# Patient Record
Sex: FEMALE | Race: ASIAN | NOT HISPANIC OR LATINO | Employment: UNEMPLOYED | URBAN - METROPOLITAN AREA
[De-identification: names, ages, dates, MRNs, and addresses within clinical notes are randomized per-mention and may not be internally consistent; named-entity substitution may affect disease eponyms.]

---

## 2017-12-27 ENCOUNTER — HOSPITAL ENCOUNTER (EMERGENCY)
Facility: HOSPITAL | Age: 72
Discharge: HOME | End: 2017-12-27
Payer: MEDICARE

## 2017-12-27 VITALS
SYSTOLIC BLOOD PRESSURE: 142 MMHG | BODY MASS INDEX: 19.83 KG/M2 | WEIGHT: 101 LBS | DIASTOLIC BLOOD PRESSURE: 77 MMHG | OXYGEN SATURATION: 99 % | TEMPERATURE: 97 F | RESPIRATION RATE: 20 BRPM | HEART RATE: 88 BPM | HEIGHT: 60 IN

## 2017-12-27 DIAGNOSIS — E86.0 DEHYDRATION: ICD-10-CM

## 2017-12-27 DIAGNOSIS — R91.1 PULMONARY NODULE: ICD-10-CM

## 2017-12-27 DIAGNOSIS — H60.91 OTITIS EXTERNA OF RIGHT EAR, UNSPECIFIED CHRONICITY, UNSPECIFIED TYPE: ICD-10-CM

## 2017-12-27 DIAGNOSIS — R53.83 FATIGUE: ICD-10-CM

## 2017-12-27 DIAGNOSIS — E87.1 HYPONATREMIA: Primary | ICD-10-CM

## 2017-12-27 DIAGNOSIS — I10 ORTHOSTATIC HYPERTENSION: ICD-10-CM

## 2017-12-27 LAB
ANION GAP SERPL CALC-SCNC: 11 MMOL/L
BASOPHILS # BLD AUTO: 0.03 K/UL
BASOPHILS NFR BLD: 0.3 %
BNP SERPL-MCNC: <10 PG/ML
BUN SERPL-MCNC: 18 MG/DL
CALCIUM SERPL-MCNC: 10.4 MG/DL
CHLORIDE SERPL-SCNC: 96 MMOL/L
CO2 SERPL-SCNC: 23 MMOL/L
CREAT SERPL-MCNC: 1.1 MG/DL
DIFFERENTIAL METHOD: ABNORMAL
EOSINOPHIL # BLD AUTO: 0.1 K/UL
EOSINOPHIL NFR BLD: 1.3 %
ERYTHROCYTE [DISTWIDTH] IN BLOOD BY AUTOMATED COUNT: 12.3 %
EST. GFR  (AFRICAN AMERICAN): 58 ML/MIN/1.73 M^2
EST. GFR  (NON AFRICAN AMERICAN): 51 ML/MIN/1.73 M^2
GLUCOSE SERPL-MCNC: 148 MG/DL
HCT VFR BLD AUTO: 35.3 %
HGB BLD-MCNC: 12.2 G/DL
LYMPHOCYTES # BLD AUTO: 2.7 K/UL
LYMPHOCYTES NFR BLD: 30 %
MCH RBC QN AUTO: 30.2 PG
MCHC RBC AUTO-ENTMCNC: 34.6 G/DL
MCV RBC AUTO: 87 FL
MONOCYTES # BLD AUTO: 0.5 K/UL
MONOCYTES NFR BLD: 5.6 %
NEUTROPHILS # BLD AUTO: 5.7 K/UL
NEUTROPHILS NFR BLD: 62.8 %
PLATELET # BLD AUTO: 228 K/UL
PMV BLD AUTO: 9.5 FL
POTASSIUM SERPL-SCNC: 4.8 MMOL/L
RBC # BLD AUTO: 4.04 M/UL
SODIUM SERPL-SCNC: 130 MMOL/L
TROPONIN I SERPL DL<=0.01 NG/ML-MCNC: 0.01 NG/ML
WBC # BLD AUTO: 9.03 K/UL

## 2017-12-27 PROCEDURE — 25000003 PHARM REV CODE 250: Performed by: PHYSICIAN ASSISTANT

## 2017-12-27 PROCEDURE — 93010 ELECTROCARDIOGRAM REPORT: CPT | Mod: ,,, | Performed by: INTERNAL MEDICINE

## 2017-12-27 PROCEDURE — 96360 HYDRATION IV INFUSION INIT: CPT

## 2017-12-27 PROCEDURE — 84484 ASSAY OF TROPONIN QUANT: CPT

## 2017-12-27 PROCEDURE — 99284 EMERGENCY DEPT VISIT MOD MDM: CPT | Mod: 25

## 2017-12-27 PROCEDURE — 85025 COMPLETE CBC W/AUTO DIFF WBC: CPT

## 2017-12-27 PROCEDURE — 83880 ASSAY OF NATRIURETIC PEPTIDE: CPT

## 2017-12-27 PROCEDURE — 93005 ELECTROCARDIOGRAM TRACING: CPT

## 2017-12-27 PROCEDURE — 80048 BASIC METABOLIC PNL TOTAL CA: CPT

## 2017-12-27 RX ORDER — NEOMYCIN SULFATE, POLYMYXIN B SULFATE, HYDROCORTISONE 3.5; 10000; 1 MG/ML; [USP'U]/ML; MG/ML
4 SOLUTION/ DROPS AURICULAR (OTIC)
Status: COMPLETED | OUTPATIENT
Start: 2017-12-27 | End: 2017-12-27

## 2017-12-27 RX ORDER — NEOMYCIN SULFATE, POLYMYXIN B SULFATE AND HYDROCORTISONE 10; 3.5; 1 MG/ML; MG/ML; [USP'U]/ML
4 SUSPENSION/ DROPS AURICULAR (OTIC) 3 TIMES DAILY
Qty: 10 ML | Refills: 0 | Status: SHIPPED | OUTPATIENT
Start: 2017-12-27 | End: 2023-09-06

## 2017-12-27 RX ADMIN — NEOMYCIN SULFATE, POLYMYXIN B SULFATE, HYDROCORTISONE 4 DROP: 3.5; 10000; 1 SOLUTION/ DROPS AURICULAR (OTIC) at 10:12

## 2017-12-27 RX ADMIN — SODIUM CHLORIDE 1000 ML: 0.9 INJECTION, SOLUTION INTRAVENOUS at 08:12

## 2017-12-28 NOTE — ED PROVIDER NOTES
"SCRIBE #1 NOTE: I, Chandra Maher, am scribing for, and in the presence of, XENA Alexandra. I have scribed the entire note.      History      Chief Complaint   Patient presents with    Hypertension       Review of patient's allergies indicates:  No Known Allergies     HPI   HPI    12/27/2017, 7:29 PM   History obtained from the son and patient      History of Present Illness: Shannan Shin is a 72 y.o. female patient with a hx of HTN and DM presents to the Emergency Department for an emergent evaluation due to fatigue, decreased appetite, right ear pain, and uncontrolled blood pressure. She lives in a private residence with her daughter and son in law. He is the main historian and is able to translate for her. He states that he first noticed her symptoms about 1 week ago. Pt was seen by her PCP for this week. She was treated with ear wax removal drops and instructed to take her Amlodipine in the evening instead of in the morning. Despite following the PCP's instructions, her systolic BP was in the 180s this evening, which prompted her son in law to bring her to the ER. He states that she is still complaining of right ear pain, even after using the ear wax removal drops. He states that she "seems tired" and is "only eating small portions".  in for an evaluation. Pt and family deny any fever, chills, SOB, dysuria, HA, CP, and all other sx at this time.     Arrival mode: Personal vehicle    PCP: Danny Jackson MD       Past Medical History:  Past Medical History:   Diagnosis Date    Diabetes mellitus     HTN (hypertension)     Hyperlipidemia        Past Surgical History:  Past Surgical History:   Procedure Laterality Date    COLONOSCOPY N/A 2/2/2016    Procedure: COLONOSCOPY;  Surgeon: Issac Gracia MD;  Location: [facility];  Service: Endoscopy;  Laterality: N/A;        Family History:  Family history reviewed not relevant      Social History:  Social History     Social History Main Topics    Smoking " status: Former Smoker    Smokeless tobacco: Current User      Comment: quit 30-40  yrs ago    Alcohol use No    Drug use: No    Sexual activity: Unknown       ROS   Review of Systems   Constitutional: Positive for appetite change (Decreased) and fatigue. Negative for diaphoresis and fever.   HENT: Positive for ear pain (Right ear). Negative for congestion, ear discharge, facial swelling, rhinorrhea, sore throat, trouble swallowing and voice change.    Eyes: Negative for discharge and redness.   Respiratory: Negative for cough, shortness of breath and wheezing.    Cardiovascular: Negative for chest pain and leg swelling.   Gastrointestinal: Negative for abdominal distention, abdominal pain, blood in stool, constipation, diarrhea, nausea and vomiting.   Genitourinary: Negative for difficulty urinating, dysuria, flank pain, frequency and hematuria.   Musculoskeletal: Negative for back pain, gait problem, myalgias, neck pain and neck stiffness.   Skin: Negative for color change and rash.   Neurological: Negative for dizziness, seizures, syncope, facial asymmetry, speech difficulty, weakness, light-headedness, numbness and headaches.   Psychiatric/Behavioral: Negative for confusion.     Physical Exam      Initial Vitals [12/27/17 1805]   BP Pulse Resp Temp SpO2   (!) 148/74 107 20 98.7 °F (37.1 °C) 98 %      MAP       98.67          Physical Exam  Nursing Notes and Vital Signs Reviewed.  Constitutional: Patient does not appear to be in any distress. Well-developed and well-nourished. Ambulatory. Alert and interactive.   Head: Normocephalic.  Eyes: PERRL. EOM intact. Conjunctivae are not pale. No scleral icterus.  ENT: Mucous membranes are moist. Clear throat. No sinus congestion. Right external auditory canal is macerated, erythematous and swollen. No otorrhea. No mastoid tenderness. No adenopathy.   Neck: Supple. Full ROM. No lymphadenopathy.  Cardiovascular: Regular rate. Regular rhythm.  Pulmonary/Chest: No  respiratory distress. Clear to auscultation bilaterally. No cough. No tachypnea. No wheezing or rales.  Abdominal: Soft and non-distended. Non-tender.   Musculoskeletal: Moves all extremities. No obvious deformities. No lower leg edema. No CVA tenderness.   Skin: Warm and dry. No rash.   Neurological:  Alert, awake, and appropriate. Baseline cognitive state per family. Normal gait. Equal . Follows commands appropriately. No facial droop. No acute focal neurological deficits are appreciated.  Psychiatric: Normal affect. Good eye contact. Appropriate in content.    ED Course    Procedures  ED Vital Signs:  Vitals:    12/27/17 1805 12/27/17 2046 12/27/17 2047 12/27/17 2049   BP: (!) 148/74 (!) 149/69 (!) 160/91 (!) 162/76   Pulse: 107 93 98 108   Resp: 20   18   Temp: 98.7 °F (37.1 °C)      TempSrc: Oral      SpO2: 98% 97%  99%   Weight: 45.8 kg (100 lb 15.5 oz)      Height: 5' (1.524 m)          Abnormal Lab Results:  Labs Reviewed   CBC W/ AUTO DIFFERENTIAL - Abnormal; Notable for the following:        Result Value    Hematocrit 35.3 (*)     All other components within normal limits   BASIC METABOLIC PANEL - Abnormal; Notable for the following:     Sodium 130 (*)     Glucose 148 (*)     eGFR if  58 (*)     eGFR if non  51 (*)     All other components within normal limits   TROPONIN I   B-TYPE NATRIURETIC PEPTIDE        All Lab Results:  Results for orders placed or performed during the hospital encounter of 12/27/17   CBC auto differential   Result Value Ref Range    WBC 9.03 3.90 - 12.70 K/uL    RBC 4.04 4.00 - 5.40 M/uL    Hemoglobin 12.2 12.0 - 16.0 g/dL    Hematocrit 35.3 (L) 37.0 - 48.5 %    MCV 87 82 - 98 fL    MCH 30.2 27.0 - 31.0 pg    MCHC 34.6 32.0 - 36.0 g/dL    RDW 12.3 11.5 - 14.5 %    Platelets 228 150 - 350 K/uL    MPV 9.5 9.2 - 12.9 fL    Gran # 5.7 1.8 - 7.7 K/uL    Lymph # 2.7 1.0 - 4.8 K/uL    Mono # 0.5 0.3 - 1.0 K/uL    Eos # 0.1 0.0 - 0.5 K/uL    Baso # 0.03  0.00 - 0.20 K/uL    Gran% 62.8 38.0 - 73.0 %    Lymph% 30.0 18.0 - 48.0 %    Mono% 5.6 4.0 - 15.0 %    Eosinophil% 1.3 0.0 - 8.0 %    Basophil% 0.3 0.0 - 1.9 %    Differential Method Automated    Basic metabolic panel   Result Value Ref Range    Sodium 130 (L) 136 - 145 mmol/L    Potassium 4.8 3.5 - 5.1 mmol/L    Chloride 96 95 - 110 mmol/L    CO2 23 23 - 29 mmol/L    Glucose 148 (H) 70 - 110 mg/dL    BUN, Bld 18 8 - 23 mg/dL    Creatinine 1.1 0.5 - 1.4 mg/dL    Calcium 10.4 8.7 - 10.5 mg/dL    Anion Gap 11 8 - 16 mmol/L    eGFR if African American 58 (A) >60 mL/min/1.73 m^2    eGFR if non African American 51 (A) >60 mL/min/1.73 m^2   Troponin I   Result Value Ref Range    Troponin I 0.009 0.000 - 0.026 ng/mL   Brain natriuretic peptide   Result Value Ref Range    BNP <10 0 - 99 pg/mL         Imaging Results:  Imaging Results          X-Ray Chest PA And Lateral (Final result)  Result time 12/27/17 19:57:31    Final result by Diego Hu MD (12/27/17 19:57:31)                 Impression:     7 mm ill-defined opacity peripheral right upper lobe, scarring versus nodule.  No old study.      Electronically signed by: DIEGO HU  Date:     12/27/17  Time:    19:57              Narrative:    Chest x-ray 2 views    Indication:R53.83 Other fatigue;    Findings: There is no prior study.  Normal size heart.  Aortic atherosclerosis.  No congestion.  There is a 7 mm ill-defined opacity peripheral right upper lobe just between the anterior 2nd and 3rd ribs.  Scarring?  Nodule?  Otherwise, lungs are clear.                             Vitals:    12/27/17 1805 12/27/17 2046 12/27/17 2047 12/27/17 2049   BP: (!) 148/74 (!) 149/69 (!) 160/91 (!) 162/76   BP Location: Right arm      Patient Position: Sitting Lying Sitting Standing   Pulse: 107 93 98 108   Resp: 20   18   Temp: 98.7 °F (37.1 °C)      TempSrc: Oral      SpO2: 98% 97%  99%   Weight: 45.8 kg (100 lb 15.5 oz)      Height: 5' (1.524 m)             The EKG was ordered,  reviewed, and independently interpreted by the ED provider.  Interpretation time: 19:58  Rate: 98 BPM  Rhythm: normal sinus rhythm  Interpretation: No STEMI.      The Emergency Provider reviewed the vital signs and test results, which are outlined above.    ED Discussion     9:47 PM: Reassessed pt at this time. Pt is awake, alert, and in NAD. Discussed with pt all pertinent ED information. Discussed pt dx of hyponatremia and plan of tx. Gave pt all f/u and return to the ED instructions. All questions and concerns were addressed at this time. Pt expresses understanding of information and instructions, and is comfortable with plan to discharge. Pt is stable for discharge.    I discussed with patient and/or family/caretaker that evaluation in the ED does not suggest any emergent or life threatening medical conditions requiring immediate intervention beyond what was provided in the ED, and I believe patient is safe for discharge.  Regardless, an unremarkable evaluation in the ED does not preclude the development or presence of a serious of life threatening condition. As such, patient was instructed to return immediately for any worsening or change in current symptoms.      ED Medication(s):  Medications   neomycin-polymyxin-hydrocortisone otic solution 4 drop (not administered)   sodium chloride 0.9% bolus 1,000 mL (0 mLs Intravenous Stopped 12/27/17 2137)       New Prescriptions    NEOMYCIN-POLYMYXIN-HYDROCORTISONE (CORTISPORIN) 3.5-10,000-1 MG/ML-UNIT/ML-% OTIC SUSPENSION    Place 4 drops into the right ear 3 (three) times daily.       Follow-up Information     Danny Jackson MD. Schedule an appointment as soon as possible for a visit in 2 days.    Specialty:  Cardiology  Why:  Follow up with your primary care physician in the next 1-2 days for re-evaluation and further management.  Contact information:  [address] [place] [address] [phone]             [facility] - .    Specialty:   Emergency Medicine  Why:  If symptoms worsen in any way.  Contact information:  [address]  [facility] [address] [phone]                   Medical Decision Making    Medical Decision Making:   History:   I obtained history from: someone other than patient.  Old Medical Records: I decided to obtain old medical records.  Initial Assessment:   Here due to fatigue, decreased appetite, right otalgia, and elevated blood pressure x 1 week per family.   Clinical Tests:   Lab Tests: Ordered and Reviewed  Radiological Study: Ordered and Reviewed  Medical Tests: Ordered and Reviewed  ED Management:  Labs reviewed - low Na - IV fluids administered in the ER   Orthostatic Vital signs reviewed - Increased HR and BP with position changes - IV fluid bolus administered in the ER.   Right otalgia - appears that they have been overusing the Debrox drops; the canal is wax free, but macerated, erythematous, and swollen, tender to exam. Advised to DC Debrox - Cortisporin otic drops provided  Discussed pulmonary nodule and need for follow up with repeat chest x ray in 3 months.         Additional MDM:   EKG: I have independently interpreted EKG(s) - see notes.   X-Rays: I have independently interpreted X-Ray(s) - see notes.        Scribe Attestation:   Scribe #1: I performed the above scribed service and the documentation accurately describes the services I performed. I attest to the accuracy of the note.    Attending:   Physician Attestation Statement for Scribe #1: I, XENA Alexandra, personally performed the services described in this documentation, as scribed by Chandra Maher, in my presence, and it is both accurate and complete.          Clinical Impression       ICD-10-CM ICD-9-CM   1. Hyponatremia E87.1 276.1   2. Fatigue R53.83 780.79   3. Otitis externa of right ear, unspecified chronicity, unspecified type H60.91 380.10   4. Orthostatic hypertension I10 401.9   5. Dehydration E86.0 276.51    6. Pulmonary nodule R91.1 793.11       Disposition:   Disposition: Discharged  Condition: Stable         Kevin Morales PA-C  12/27/17 2059

## 2023-09-05 LAB
INFLUENZA A, MOLECULAR: NEGATIVE
INFLUENZA B, MOLECULAR: NEGATIVE
SARS-COV-2 RDRP RESP QL NAA+PROBE: NEGATIVE
SPECIMEN SOURCE: NORMAL

## 2023-09-05 PROCEDURE — 87502 INFLUENZA DNA AMP PROBE: CPT | Performed by: EMERGENCY MEDICINE

## 2023-09-05 PROCEDURE — U0002 COVID-19 LAB TEST NON-CDC: HCPCS | Performed by: EMERGENCY MEDICINE

## 2023-09-05 PROCEDURE — 87502 INFLUENZA DNA AMP PROBE: CPT

## 2023-09-06 ENCOUNTER — HOSPITAL ENCOUNTER (INPATIENT)
Facility: HOSPITAL | Age: 78
LOS: 3 days | Discharge: HOME | DRG: 872 | End: 2023-09-09
Attending: EMERGENCY MEDICINE | Admitting: INTERNAL MEDICINE
Payer: MEDICARE

## 2023-09-06 DIAGNOSIS — R07.9 CHEST PAIN: ICD-10-CM

## 2023-09-06 DIAGNOSIS — A41.9 SEPSIS DUE TO URINARY TRACT INFECTION: ICD-10-CM

## 2023-09-06 DIAGNOSIS — N39.0 SEPSIS DUE TO URINARY TRACT INFECTION: ICD-10-CM

## 2023-09-06 DIAGNOSIS — R53.1 WEAKNESS: ICD-10-CM

## 2023-09-06 PROBLEM — E11.9 DIABETES: Status: ACTIVE | Noted: 2023-09-06

## 2023-09-06 PROBLEM — N17.9 ARF (ACUTE RENAL FAILURE): Status: ACTIVE | Noted: 2023-09-06

## 2023-09-06 PROBLEM — E87.1 HYPONATREMIA: Status: ACTIVE | Noted: 2023-09-06

## 2023-09-06 PROBLEM — N30.00 ACUTE CYSTITIS WITHOUT HEMATURIA: Status: ACTIVE | Noted: 2023-09-06

## 2023-09-06 PROBLEM — I10 HYPERTENSION: Status: ACTIVE | Noted: 2023-09-06

## 2023-09-06 PROBLEM — E87.20 METABOLIC ACIDOSIS: Status: ACTIVE | Noted: 2023-09-06

## 2023-09-06 LAB
ABO + RH BLD: NORMAL
ACANTHOCYTES BLD QL SMEAR: ABNORMAL
ACINETOBACTER CALCOACETICUS/BAUMANNII COMPLEX: NOT DETECTED
ALBUMIN SERPL BCP-MCNC: 2.7 G/DL (ref 3.5–5.2)
ALBUMIN SERPL BCP-MCNC: 3.3 G/DL (ref 3.5–5.2)
ALP SERPL-CCNC: 51 U/L (ref 55–135)
ALP SERPL-CCNC: 60 U/L (ref 55–135)
ALT SERPL W/O P-5'-P-CCNC: 12 U/L (ref 10–44)
ALT SERPL W/O P-5'-P-CCNC: 13 U/L (ref 10–44)
ANION GAP SERPL CALC-SCNC: 12 MMOL/L (ref 8–16)
ANION GAP SERPL CALC-SCNC: 14 MMOL/L (ref 8–16)
ANISOCYTOSIS BLD QL SMEAR: SLIGHT
AST SERPL-CCNC: 23 U/L (ref 10–40)
AST SERPL-CCNC: 24 U/L (ref 10–40)
BACTERIA #/AREA URNS HPF: ABNORMAL /HPF
BACTEROIDES FRAGILIS: NOT DETECTED
BASOPHILS NFR BLD: 0 % (ref 0–1.9)
BASOPHILS NFR BLD: 0 % (ref 0–1.9)
BILIRUB SERPL-MCNC: 0.4 MG/DL (ref 0.1–1)
BILIRUB SERPL-MCNC: 0.5 MG/DL (ref 0.1–1)
BILIRUB UR QL STRIP: NEGATIVE
BLD GP AB SCN CELLS X3 SERPL QL: NORMAL
BLD PROD TYP BPU: NORMAL
BLOOD UNIT EXPIRATION DATE: NORMAL
BLOOD UNIT TYPE CODE: 7300
BLOOD UNIT TYPE: NORMAL
BNP SERPL-MCNC: 97 PG/ML (ref 0–99)
BUN SERPL-MCNC: 25 MG/DL (ref 8–23)
BUN SERPL-MCNC: 26 MG/DL (ref 8–23)
BURR CELLS BLD QL SMEAR: ABNORMAL
CALCIUM SERPL-MCNC: 8 MG/DL (ref 8.7–10.5)
CALCIUM SERPL-MCNC: 9.2 MG/DL (ref 8.7–10.5)
CANDIDA ALBICANS: NOT DETECTED
CANDIDA AURIS: NOT DETECTED
CANDIDA GLABRATA: NOT DETECTED
CANDIDA KRUSEI: NOT DETECTED
CANDIDA PARAPSILOSIS: NOT DETECTED
CANDIDA TROPICALIS: NOT DETECTED
CHLORIDE SERPL-SCNC: 101 MMOL/L (ref 95–110)
CHLORIDE SERPL-SCNC: 107 MMOL/L (ref 95–110)
CLARITY UR: ABNORMAL
CO2 SERPL-SCNC: 13 MMOL/L (ref 23–29)
CO2 SERPL-SCNC: 16 MMOL/L (ref 23–29)
CODING SYSTEM: NORMAL
COLOR UR: YELLOW
CREAT SERPL-MCNC: 1.9 MG/DL (ref 0.5–1.4)
CREAT SERPL-MCNC: 2.2 MG/DL (ref 0.5–1.4)
CROSSMATCH INTERPRETATION: NORMAL
CRYPTOCOCCUS NEOFORMANS/GATTII: NOT DETECTED
CTX-M GENE: NOT DETECTED
DIFFERENTIAL METHOD: ABNORMAL
DIFFERENTIAL METHOD: ABNORMAL
DISPENSE STATUS: NORMAL
DOHLE BOD BLD QL SMEAR: PRESENT
ENTEROBACTER CLOACAE COMPLEX: NOT DETECTED
ENTEROBACTERALES: ABNORMAL
ENTEROCOCCUS FAECALIS: NOT DETECTED
ENTEROCOCCUS FAECIUM: NOT DETECTED
EOSINOPHIL NFR BLD: 0 % (ref 0–8)
EOSINOPHIL NFR BLD: 0 % (ref 0–8)
ERYTHROCYTE [DISTWIDTH] IN BLOOD BY AUTOMATED COUNT: 11.4 % (ref 11.5–14.5)
ERYTHROCYTE [DISTWIDTH] IN BLOOD BY AUTOMATED COUNT: 11.4 % (ref 11.5–14.5)
ESCHERICHIA COLI: DETECTED
EST. GFR  (NO RACE VARIABLE): 23 ML/MIN/1.73 M^2
EST. GFR  (NO RACE VARIABLE): 27 ML/MIN/1.73 M^2
ESTIMATED AVG GLUCOSE: 117 MG/DL (ref 68–131)
FERRITIN SERPL-MCNC: 307 NG/ML (ref 20–300)
FOLATE SERPL-MCNC: 11.8 NG/ML (ref 4–24)
GLUCOSE SERPL-MCNC: 111 MG/DL (ref 70–110)
GLUCOSE SERPL-MCNC: 113 MG/DL (ref 70–110)
GLUCOSE UR QL STRIP: NEGATIVE
HAEMOPHILUS INFLUENZAE: NOT DETECTED
HBA1C MFR BLD: 5.7 % (ref 4–5.6)
HCT VFR BLD AUTO: 24.3 % (ref 37–48.5)
HCT VFR BLD AUTO: 27.4 % (ref 37–48.5)
HCV AB SERPL QL IA: NEGATIVE
HEP C VIRUS HOLD SPECIMEN: NORMAL
HGB BLD-MCNC: 7.7 G/DL (ref 12–16)
HGB BLD-MCNC: 9.1 G/DL (ref 12–16)
HGB UR QL STRIP: ABNORMAL
HIV 1+2 AB+HIV1 P24 AG SERPL QL IA: NEGATIVE
HYALINE CASTS #/AREA URNS LPF: 2 /LPF
IMM GRANULOCYTES # BLD AUTO: ABNORMAL K/UL (ref 0–0.04)
IMM GRANULOCYTES # BLD AUTO: ABNORMAL K/UL (ref 0–0.04)
IMM GRANULOCYTES NFR BLD AUTO: ABNORMAL % (ref 0–0.5)
IMM GRANULOCYTES NFR BLD AUTO: ABNORMAL % (ref 0–0.5)
IMP GENE: NOT DETECTED
INFLUENZA A, MOLECULAR: NEGATIVE
INFLUENZA B, MOLECULAR: NEGATIVE
IRON SERPL-MCNC: 12 UG/DL (ref 30–160)
KETONES UR QL STRIP: ABNORMAL
KLEBSIELLA AEROGENES: NOT DETECTED
KLEBSIELLA OXYTOCA: NOT DETECTED
KLEBSIELLA PNEUMONIAE GROUP: NOT DETECTED
KPC: NOT DETECTED
LACTATE SERPL-SCNC: 0.7 MMOL/L (ref 0.5–2.2)
LACTATE SERPL-SCNC: 1.4 MMOL/L (ref 0.5–2.2)
LEUKOCYTE ESTERASE UR QL STRIP: ABNORMAL
LISTERIA MONOCYTOGENES: NOT DETECTED
LYMPHOCYTES NFR BLD: 8 % (ref 18–48)
LYMPHOCYTES NFR BLD: 9 % (ref 18–48)
MAGNESIUM SERPL-MCNC: 1.3 MG/DL (ref 1.6–2.6)
MCH RBC QN AUTO: 30.4 PG (ref 27–31)
MCH RBC QN AUTO: 31 PG (ref 27–31)
MCHC RBC AUTO-ENTMCNC: 31.7 G/DL (ref 32–36)
MCHC RBC AUTO-ENTMCNC: 33.2 G/DL (ref 32–36)
MCR-1: NOT DETECTED
MCV RBC AUTO: 93 FL (ref 82–98)
MCV RBC AUTO: 96 FL (ref 82–98)
MEC A/C AND MREJ (MRSA): NOT DETECTED
MEC A/C: NOT DETECTED
MICROSCOPIC COMMENT: ABNORMAL
MONOCYTES NFR BLD: 1 % (ref 4–15)
MONOCYTES NFR BLD: 4 % (ref 4–15)
NDM GENE: NOT DETECTED
NEISSERIA MENINGITIDIS: NOT DETECTED
NEUTROPHILS NFR BLD: 74 % (ref 38–73)
NEUTROPHILS NFR BLD: 79 % (ref 38–73)
NEUTS BAND NFR BLD MANUAL: 16 %
NEUTS BAND NFR BLD MANUAL: 9 %
NITRITE UR QL STRIP: NEGATIVE
NRBC BLD-RTO: 0 /100 WBC
NRBC BLD-RTO: 0 /100 WBC
NUM UNITS TRANS PACKED RBC: NORMAL
OVALOCYTES BLD QL SMEAR: ABNORMAL
OXA-48-LIKE: NOT DETECTED
PH UR STRIP: 6 [PH] (ref 5–8)
PHOSPHATE SERPL-MCNC: 3.8 MG/DL (ref 2.7–4.5)
PLATELET # BLD AUTO: 160 K/UL (ref 150–450)
PLATELET # BLD AUTO: 195 K/UL (ref 150–450)
PLATELET BLD QL SMEAR: ABNORMAL
PMV BLD AUTO: 10.1 FL (ref 9.2–12.9)
PMV BLD AUTO: 9.6 FL (ref 9.2–12.9)
POCT GLUCOSE: 110 MG/DL (ref 70–110)
POCT GLUCOSE: 150 MG/DL (ref 70–110)
POIKILOCYTOSIS BLD QL SMEAR: SLIGHT
POLYCHROMASIA BLD QL SMEAR: ABNORMAL
POLYCHROMASIA BLD QL SMEAR: ABNORMAL
POTASSIUM SERPL-SCNC: 4 MMOL/L (ref 3.5–5.1)
POTASSIUM SERPL-SCNC: 4.7 MMOL/L (ref 3.5–5.1)
PROCALCITONIN SERPL IA-MCNC: 27.92 NG/ML
PROT SERPL-MCNC: 5.9 G/DL (ref 6–8.4)
PROT SERPL-MCNC: 7.2 G/DL (ref 6–8.4)
PROT UR QL STRIP: ABNORMAL
PROTEUS SPECIES: NOT DETECTED
PSEUDOMONAS AERUGINOSA: NOT DETECTED
RBC # BLD AUTO: 2.53 M/UL (ref 4–5.4)
RBC # BLD AUTO: 2.94 M/UL (ref 4–5.4)
RBC #/AREA URNS HPF: 9 /HPF (ref 0–4)
SALMONELLA SP: NOT DETECTED
SARS-COV-2 RDRP RESP QL NAA+PROBE: NEGATIVE
SATURATED IRON: 5 % (ref 20–50)
SERRATIA MARCESCENS: NOT DETECTED
SODIUM SERPL-SCNC: 131 MMOL/L (ref 136–145)
SODIUM SERPL-SCNC: 132 MMOL/L (ref 136–145)
SP GR UR STRIP: 1.01 (ref 1–1.03)
SPECIMEN OUTDATE: NORMAL
SPECIMEN SOURCE: NORMAL
SQUAMOUS #/AREA URNS HPF: 1 /HPF
STAPHYLOCOCCUS AUREUS: NOT DETECTED
STAPHYLOCOCCUS EPIDERMIDIS: NOT DETECTED
STAPHYLOCOCCUS LUGDUNESIS: NOT DETECTED
STAPHYLOCOCCUS SPECIES: NOT DETECTED
STENOTROPHOMONAS MALTOPHILIA: NOT DETECTED
STREPTOCOCCUS AGALACTIAE: NOT DETECTED
STREPTOCOCCUS PNEUMONIAE: NOT DETECTED
STREPTOCOCCUS PYOGENES: NOT DETECTED
STREPTOCOCCUS SPECIES: NOT DETECTED
TOTAL IRON BINDING CAPACITY: 249 UG/DL (ref 250–450)
TRANSFERRIN SERPL-MCNC: 168 MG/DL (ref 200–375)
TROPONIN I SERPL DL<=0.01 NG/ML-MCNC: <0.006 NG/ML (ref 0–0.03)
TSH SERPL DL<=0.005 MIU/L-ACNC: 0.71 UIU/ML (ref 0.4–4)
URN SPEC COLLECT METH UR: ABNORMAL
UROBILINOGEN UR STRIP-ACNC: NEGATIVE EU/DL
VAN A/B: NOT DETECTED
VIM GENE: NOT DETECTED
VIT B12 SERPL-MCNC: 400 PG/ML (ref 210–950)
WBC # BLD AUTO: 16.23 K/UL (ref 3.9–12.7)
WBC # BLD AUTO: 8.62 K/UL (ref 3.9–12.7)
WBC #/AREA URNS HPF: >100 /HPF (ref 0–5)
WBC CLUMPS URNS QL MICRO: ABNORMAL

## 2023-09-06 PROCEDURE — P9016 RBC LEUKOCYTES REDUCED: HCPCS | Performed by: NURSE PRACTITIONER

## 2023-09-06 PROCEDURE — 84484 ASSAY OF TROPONIN QUANT: CPT | Performed by: REGISTERED NURSE

## 2023-09-06 PROCEDURE — 99900035 HC TECH TIME PER 15 MIN (STAT)

## 2023-09-06 PROCEDURE — 82728 ASSAY OF FERRITIN: CPT | Performed by: EMERGENCY MEDICINE

## 2023-09-06 PROCEDURE — 63600175 PHARM REV CODE 636 W HCPCS: Performed by: EMERGENCY MEDICINE

## 2023-09-06 PROCEDURE — 82962 GLUCOSE BLOOD TEST: CPT

## 2023-09-06 PROCEDURE — 87502 INFLUENZA DNA AMP PROBE: CPT | Performed by: REGISTERED NURSE

## 2023-09-06 PROCEDURE — 86900 BLOOD TYPING SEROLOGIC ABO: CPT | Performed by: NURSE PRACTITIONER

## 2023-09-06 PROCEDURE — 36430 TRANSFUSION BLD/BLD COMPNT: CPT

## 2023-09-06 PROCEDURE — 63600175 PHARM REV CODE 636 W HCPCS: Performed by: INTERNAL MEDICINE

## 2023-09-06 PROCEDURE — 85025 COMPLETE CBC W/AUTO DIFF WBC: CPT | Performed by: NURSE PRACTITIONER

## 2023-09-06 PROCEDURE — 25000003 PHARM REV CODE 250: Performed by: NURSE PRACTITIONER

## 2023-09-06 PROCEDURE — 81000 URINALYSIS NONAUTO W/SCOPE: CPT | Performed by: REGISTERED NURSE

## 2023-09-06 PROCEDURE — 97530 THERAPEUTIC ACTIVITIES: CPT

## 2023-09-06 PROCEDURE — 96365 THER/PROPH/DIAG IV INF INIT: CPT

## 2023-09-06 PROCEDURE — 80053 COMPREHEN METABOLIC PANEL: CPT | Performed by: REGISTERED NURSE

## 2023-09-06 PROCEDURE — 86803 HEPATITIS C AB TEST: CPT | Performed by: EMERGENCY MEDICINE

## 2023-09-06 PROCEDURE — 87389 HIV-1 AG W/HIV-1&-2 AB AG IA: CPT | Performed by: EMERGENCY MEDICINE

## 2023-09-06 PROCEDURE — 86920 COMPATIBILITY TEST SPIN: CPT | Performed by: NURSE PRACTITIONER

## 2023-09-06 PROCEDURE — 82607 VITAMIN B-12: CPT | Performed by: EMERGENCY MEDICINE

## 2023-09-06 PROCEDURE — 80053 COMPREHEN METABOLIC PANEL: CPT | Mod: 91 | Performed by: NURSE PRACTITIONER

## 2023-09-06 PROCEDURE — 36415 COLL VENOUS BLD VENIPUNCTURE: CPT | Performed by: EMERGENCY MEDICINE

## 2023-09-06 PROCEDURE — 85007 BL SMEAR W/DIFF WBC COUNT: CPT | Performed by: EMERGENCY MEDICINE

## 2023-09-06 PROCEDURE — 83735 ASSAY OF MAGNESIUM: CPT | Performed by: NURSE PRACTITIONER

## 2023-09-06 PROCEDURE — 11000001 HC ACUTE MED/SURG PRIVATE ROOM

## 2023-09-06 PROCEDURE — 36415 COLL VENOUS BLD VENIPUNCTURE: CPT | Performed by: HOSPITALIST

## 2023-09-06 PROCEDURE — 84100 ASSAY OF PHOSPHORUS: CPT | Performed by: NURSE PRACTITIONER

## 2023-09-06 PROCEDURE — U0002 COVID-19 LAB TEST NON-CDC: HCPCS | Performed by: REGISTERED NURSE

## 2023-09-06 PROCEDURE — 25000003 PHARM REV CODE 250: Performed by: EMERGENCY MEDICINE

## 2023-09-06 PROCEDURE — 83605 ASSAY OF LACTIC ACID: CPT | Performed by: EMERGENCY MEDICINE

## 2023-09-06 PROCEDURE — 63600175 PHARM REV CODE 636 W HCPCS: Performed by: NURSE PRACTITIONER

## 2023-09-06 PROCEDURE — 83880 ASSAY OF NATRIURETIC PEPTIDE: CPT | Performed by: EMERGENCY MEDICINE

## 2023-09-06 PROCEDURE — 87077 CULTURE AEROBIC IDENTIFY: CPT | Performed by: REGISTERED NURSE

## 2023-09-06 PROCEDURE — 87186 SC STD MICRODIL/AGAR DIL: CPT | Mod: 59 | Performed by: REGISTERED NURSE

## 2023-09-06 PROCEDURE — 83036 HEMOGLOBIN GLYCOSYLATED A1C: CPT | Performed by: EMERGENCY MEDICINE

## 2023-09-06 PROCEDURE — 83605 ASSAY OF LACTIC ACID: CPT | Mod: 91 | Performed by: REGISTERED NURSE

## 2023-09-06 PROCEDURE — 97166 OT EVAL MOD COMPLEX 45 MIN: CPT

## 2023-09-06 PROCEDURE — 87040 BLOOD CULTURE FOR BACTERIA: CPT | Performed by: REGISTERED NURSE

## 2023-09-06 PROCEDURE — 83540 ASSAY OF IRON: CPT | Performed by: EMERGENCY MEDICINE

## 2023-09-06 PROCEDURE — 87088 URINE BACTERIA CULTURE: CPT | Performed by: REGISTERED NURSE

## 2023-09-06 PROCEDURE — 27000221 HC OXYGEN, UP TO 24 HOURS

## 2023-09-06 PROCEDURE — 85027 COMPLETE CBC AUTOMATED: CPT | Performed by: EMERGENCY MEDICINE

## 2023-09-06 PROCEDURE — 99285 EMERGENCY DEPT VISIT HI MDM: CPT | Mod: 25

## 2023-09-06 PROCEDURE — 96361 HYDRATE IV INFUSION ADD-ON: CPT

## 2023-09-06 PROCEDURE — 93010 PR ELECTROCARDIOGRAM REPORT: ICD-10-PCS | Mod: ,,, | Performed by: INTERNAL MEDICINE

## 2023-09-06 PROCEDURE — 93010 ELECTROCARDIOGRAM REPORT: CPT | Mod: ,,, | Performed by: INTERNAL MEDICINE

## 2023-09-06 PROCEDURE — 87086 URINE CULTURE/COLONY COUNT: CPT | Performed by: REGISTERED NURSE

## 2023-09-06 PROCEDURE — 87154 CUL TYP ID BLD PTHGN 6+ TRGT: CPT | Performed by: REGISTERED NURSE

## 2023-09-06 PROCEDURE — 97162 PT EVAL MOD COMPLEX 30 MIN: CPT

## 2023-09-06 PROCEDURE — 84466 ASSAY OF TRANSFERRIN: CPT | Performed by: EMERGENCY MEDICINE

## 2023-09-06 PROCEDURE — 36415 COLL VENOUS BLD VENIPUNCTURE: CPT | Performed by: NURSE PRACTITIONER

## 2023-09-06 PROCEDURE — 82746 ASSAY OF FOLIC ACID SERUM: CPT | Performed by: EMERGENCY MEDICINE

## 2023-09-06 PROCEDURE — 93005 ELECTROCARDIOGRAM TRACING: CPT

## 2023-09-06 PROCEDURE — 84443 ASSAY THYROID STIM HORMONE: CPT | Performed by: EMERGENCY MEDICINE

## 2023-09-06 PROCEDURE — 84145 PROCALCITONIN (PCT): CPT | Performed by: REGISTERED NURSE

## 2023-09-06 RX ORDER — SIMETHICONE 80 MG
1 TABLET,CHEWABLE ORAL 4 TIMES DAILY PRN
Status: DISCONTINUED | OUTPATIENT
Start: 2023-09-06 | End: 2023-09-09 | Stop reason: HOSPADM

## 2023-09-06 RX ORDER — SODIUM,POTASSIUM PHOSPHATES 280-250MG
2 POWDER IN PACKET (EA) ORAL
Status: DISCONTINUED | OUTPATIENT
Start: 2023-09-06 | End: 2023-09-09 | Stop reason: HOSPADM

## 2023-09-06 RX ORDER — LANOLIN ALCOHOL/MO/W.PET/CERES
800 CREAM (GRAM) TOPICAL
Status: DISCONTINUED | OUTPATIENT
Start: 2023-09-06 | End: 2023-09-09 | Stop reason: HOSPADM

## 2023-09-06 RX ORDER — GLUCAGON 1 MG
1 KIT INJECTION
Status: DISCONTINUED | OUTPATIENT
Start: 2023-09-06 | End: 2023-09-09 | Stop reason: HOSPADM

## 2023-09-06 RX ORDER — ACETAMINOPHEN 325 MG/1
650 TABLET ORAL
Status: COMPLETED | OUTPATIENT
Start: 2023-09-06 | End: 2023-09-06

## 2023-09-06 RX ORDER — ENOXAPARIN SODIUM 100 MG/ML
30 INJECTION SUBCUTANEOUS EVERY 24 HOURS
Status: DISCONTINUED | OUTPATIENT
Start: 2023-09-06 | End: 2023-09-09 | Stop reason: HOSPADM

## 2023-09-06 RX ORDER — PROCHLORPERAZINE EDISYLATE 5 MG/ML
5 INJECTION INTRAMUSCULAR; INTRAVENOUS EVERY 6 HOURS PRN
Status: DISCONTINUED | OUTPATIENT
Start: 2023-09-06 | End: 2023-09-09 | Stop reason: HOSPADM

## 2023-09-06 RX ORDER — NALOXONE HCL 0.4 MG/ML
0.02 VIAL (ML) INJECTION
Status: DISCONTINUED | OUTPATIENT
Start: 2023-09-06 | End: 2023-09-09 | Stop reason: HOSPADM

## 2023-09-06 RX ORDER — MAGNESIUM SULFATE HEPTAHYDRATE 40 MG/ML
2 INJECTION, SOLUTION INTRAVENOUS ONCE
Status: COMPLETED | OUTPATIENT
Start: 2023-09-06 | End: 2023-09-06

## 2023-09-06 RX ORDER — IBUPROFEN 200 MG
16 TABLET ORAL
Status: DISCONTINUED | OUTPATIENT
Start: 2023-09-06 | End: 2023-09-09 | Stop reason: HOSPADM

## 2023-09-06 RX ORDER — HYDROCODONE BITARTRATE AND ACETAMINOPHEN 5; 325 MG/1; MG/1
1 TABLET ORAL EVERY 6 HOURS PRN
Status: DISCONTINUED | OUTPATIENT
Start: 2023-09-06 | End: 2023-09-09 | Stop reason: HOSPADM

## 2023-09-06 RX ORDER — SODIUM CHLORIDE 0.9 % (FLUSH) 0.9 %
10 SYRINGE (ML) INJECTION EVERY 8 HOURS PRN
Status: DISCONTINUED | OUTPATIENT
Start: 2023-09-06 | End: 2023-09-09 | Stop reason: HOSPADM

## 2023-09-06 RX ORDER — IPRATROPIUM BROMIDE AND ALBUTEROL SULFATE 2.5; .5 MG/3ML; MG/3ML
3 SOLUTION RESPIRATORY (INHALATION) EVERY 6 HOURS PRN
Status: DISCONTINUED | OUTPATIENT
Start: 2023-09-06 | End: 2023-09-09 | Stop reason: HOSPADM

## 2023-09-06 RX ORDER — POLYETHYLENE GLYCOL 3350 17 G/17G
17 POWDER, FOR SOLUTION ORAL 2 TIMES DAILY PRN
Status: DISCONTINUED | OUTPATIENT
Start: 2023-09-06 | End: 2023-09-09 | Stop reason: HOSPADM

## 2023-09-06 RX ORDER — ONDANSETRON 2 MG/ML
4 INJECTION INTRAMUSCULAR; INTRAVENOUS EVERY 6 HOURS PRN
Status: DISCONTINUED | OUTPATIENT
Start: 2023-09-06 | End: 2023-09-09 | Stop reason: HOSPADM

## 2023-09-06 RX ORDER — MAG HYDROX/ALUMINUM HYD/SIMETH 200-200-20
30 SUSPENSION, ORAL (FINAL DOSE FORM) ORAL 4 TIMES DAILY PRN
Status: DISCONTINUED | OUTPATIENT
Start: 2023-09-06 | End: 2023-09-09 | Stop reason: HOSPADM

## 2023-09-06 RX ORDER — INSULIN ASPART 100 [IU]/ML
0-5 INJECTION, SOLUTION INTRAVENOUS; SUBCUTANEOUS
Status: DISCONTINUED | OUTPATIENT
Start: 2023-09-06 | End: 2023-09-09 | Stop reason: HOSPADM

## 2023-09-06 RX ORDER — SODIUM CHLORIDE 9 MG/ML
INJECTION, SOLUTION INTRAVENOUS CONTINUOUS
Status: DISCONTINUED | OUTPATIENT
Start: 2023-09-06 | End: 2023-09-06

## 2023-09-06 RX ORDER — TALC
6 POWDER (GRAM) TOPICAL NIGHTLY PRN
Status: DISCONTINUED | OUTPATIENT
Start: 2023-09-06 | End: 2023-09-09 | Stop reason: HOSPADM

## 2023-09-06 RX ORDER — IBUPROFEN 200 MG
24 TABLET ORAL
Status: DISCONTINUED | OUTPATIENT
Start: 2023-09-06 | End: 2023-09-09 | Stop reason: HOSPADM

## 2023-09-06 RX ORDER — HYDROCODONE BITARTRATE AND ACETAMINOPHEN 500; 5 MG/1; MG/1
TABLET ORAL
Status: DISCONTINUED | OUTPATIENT
Start: 2023-09-06 | End: 2023-09-09 | Stop reason: HOSPADM

## 2023-09-06 RX ORDER — ENOXAPARIN SODIUM 100 MG/ML
40 INJECTION SUBCUTANEOUS EVERY 24 HOURS
Status: DISCONTINUED | OUTPATIENT
Start: 2023-09-06 | End: 2023-09-06 | Stop reason: SDUPTHER

## 2023-09-06 RX ORDER — ACETAMINOPHEN 325 MG/1
650 TABLET ORAL EVERY 4 HOURS PRN
Status: DISCONTINUED | OUTPATIENT
Start: 2023-09-06 | End: 2023-09-09 | Stop reason: HOSPADM

## 2023-09-06 RX ADMIN — ACETAMINOPHEN 650 MG: 325 TABLET ORAL at 02:09

## 2023-09-06 RX ADMIN — SODIUM BICARBONATE: 84 INJECTION, SOLUTION INTRAVENOUS at 08:09

## 2023-09-06 RX ADMIN — ENOXAPARIN SODIUM 30 MG: 30 INJECTION SUBCUTANEOUS at 05:09

## 2023-09-06 RX ADMIN — CEFEPIME 2 G: 2 INJECTION, POWDER, FOR SOLUTION INTRAVENOUS at 02:09

## 2023-09-06 RX ADMIN — SODIUM BICARBONATE: 84 INJECTION, SOLUTION INTRAVENOUS at 11:09

## 2023-09-06 RX ADMIN — SODIUM CHLORIDE 500 ML: 9 INJECTION, SOLUTION INTRAVENOUS at 05:09

## 2023-09-06 RX ADMIN — MAGNESIUM SULFATE HEPTAHYDRATE 2 G: 40 INJECTION, SOLUTION INTRAVENOUS at 07:09

## 2023-09-06 RX ADMIN — SODIUM CHLORIDE 500 ML: 9 INJECTION, SOLUTION INTRAVENOUS at 04:09

## 2023-09-06 RX ADMIN — SODIUM CHLORIDE: 9 INJECTION, SOLUTION INTRAVENOUS at 04:09

## 2023-09-06 RX ADMIN — SODIUM CHLORIDE 1000 ML: 9 INJECTION, SOLUTION INTRAVENOUS at 01:09

## 2023-09-06 RX ADMIN — ACETAMINOPHEN 650 MG: 325 TABLET ORAL at 05:09

## 2023-09-06 NOTE — H&P
[facility] - Emergency Dept.  [facility] Medicine  History & Physical    Patient Name: Shannan Shin  MRN: [id number]  Patient Class: IP- Inpatient  Admission Date: 9/6/2023  Attending Physician:Dr. Nj  Primary Care Provider: Danny Jackson MD         Patient information was obtained from patient and ER records.     Subjective:     Principal Problem:Sepsis    Chief Complaint:   Chief Complaint   Patient presents with    Fever     C/o fever and constipation x's 2 days. Also c/o fatigue. Dx. W/ UTI 2 days ago at . Was taking cipro and pyridium x's 2 days.        HPI: The patient is a 78 yo [place] female with DM, HTN, and HLD who presents to ED with Fever, generalized weakness, and fatigue x 2 days. The pt's son reported the pt also complained of dysuria for approx one week. She was seen at  2 days ago for her symptoms and was diagnosed with a UTI discharged on Cipro and Pyridium without improvement.     In the ED, Temp 102.9F, mild tachycardia and tachypnea, BP low normal. Labs showed WBC 16 with 9% bands, Hgb 9.1 (unsure of baseline), Cr 2.2 (unsure of baseline), Na 131, Bicarb 16, LA normal, procalcitonin 27. +UTI. COVID/FLU negative. CXR- clear.       Past Medical History:   Diagnosis Date    Diabetes mellitus     HTN (hypertension)     Hyperlipidemia        Past Surgical History:   Procedure Laterality Date    COLONOSCOPY N/A 2/2/2016    Procedure: COLONOSCOPY;  Surgeon: Issac Gracia MD;  Location: [facility];  Service: Endoscopy;  Laterality: N/A;       Review of patient's allergies indicates:  No Known Allergies    No current facility-administered medications on file prior to encounter.     Current Outpatient Medications on File Prior to Encounter   Medication Sig    amlodipine (NORVASC) 5 MG tablet Take 5 mg by mouth once daily.    FOLIC ACID/MULTIVIT-MIN/LUTEIN (CENTRUM SILVER ORAL) Take 1 tablet by mouth once daily.    glyBURIDE micronized (GLYNASE) 3 MG Tab Take 1.5 mg by mouth daily with  breakfast.    losartan (COZAAR) 100 MG tablet Take 100 mg by mouth once daily.    lovastatin (MEVACOR) 20 MG tablet Take 20 mg by mouth every evening.    metformin (GLUCOPHAGE) 850 MG tablet Take 850 mg by mouth 2 (two) times daily with meals.    neomycin-polymyxin-hydrocortisone (CORTISPORIN) 3.5-10,000-1 mg/mL-unit/mL-% otic suspension Place 4 drops into the right ear 3 (three) times daily.    omeprazole (PRILOSEC) 20 MG capsule Take 1 capsule (20 mg total) by mouth 2 (two) times daily.     Family History    None       Tobacco Use    Smoking status: Former    Smokeless tobacco: Current    Tobacco comments:     quit 30-40  yrs ago   Substance and Sexual Activity    Alcohol use: No     Alcohol/week: 0.0 standard drinks of alcohol    Drug use: No    Sexual activity: Not on file     Review of Systems   Constitutional:  Positive for activity change, fatigue and fever. Negative for appetite change, chills, diaphoresis and unexpected weight change.   HENT:  Negative for congestion, nosebleeds, sinus pressure and sore throat.    Eyes:  Negative for pain, discharge and visual disturbance.   Respiratory:  Negative for cough, chest tightness, shortness of breath, wheezing and stridor.    Cardiovascular:  Negative for chest pain, palpitations and leg swelling.   Gastrointestinal:  Negative for abdominal distention, abdominal pain, blood in stool, constipation, diarrhea, nausea and vomiting.   Endocrine: Negative for cold intolerance and heat intolerance.   Genitourinary:  Positive for dysuria. Negative for difficulty urinating, flank pain, frequency and urgency.   Musculoskeletal:  Negative for arthralgias, back pain, joint swelling, myalgias, neck pain and neck stiffness.   Skin:  Negative for rash and wound.   Allergic/Immunologic: Negative for food allergies and immunocompromised state.   Neurological:  Positive for weakness (generalized). Negative for dizziness, seizures, syncope, facial asymmetry, speech  difficulty, light-headedness, numbness and headaches.   Hematological:  Negative for adenopathy.   Psychiatric/Behavioral:  Negative for agitation, confusion and hallucinations.      Objective:     Vital Signs (Most Recent):  Temp: 98.5 °F (36.9 °C) (09/06/23 0415)  Pulse: 96 (09/06/23 0415)  Resp: (!) 25 (09/06/23 0415)  BP: (!) 107/55 (09/06/23 0415)  SpO2: 100 % (09/06/23 0415) Vital Signs (24h Range):  Temp:  [98.5 °F (36.9 °C)-102.9 °F (39.4 °C)] 98.5 °F (36.9 °C)  Pulse:  [] 96  Resp:  [18-27] 25  SpO2:  [95 %-100 %] 100 %  BP: ()/(51-60) 107/55     Weight: 36.6 kg (80 lb 9.6 oz)  Body mass index is 15.74 kg/m².     Physical Exam  Vitals and nursing note reviewed.   Constitutional:       General: She is not in acute distress.     Appearance: She is underweight. She is not diaphoretic.      Comments: Thin, small framed, frail appearing    HENT:      Head: Normocephalic and atraumatic.      Nose: Nose normal.   Eyes:      General: No scleral icterus.     Conjunctiva/sclera: Conjunctivae normal.   Neck:      Trachea: No tracheal deviation.   Cardiovascular:      Rate and Rhythm: Normal rate and regular rhythm.      Heart sounds: Normal heart sounds. No murmur heard.     No friction rub. No gallop.   Pulmonary:      Effort: Pulmonary effort is normal. No respiratory distress.      Breath sounds: Normal breath sounds. No stridor. No wheezing or rales.   Chest:      Chest wall: No tenderness.   Abdominal:      General: Bowel sounds are normal. There is no distension.      Palpations: Abdomen is soft. There is no mass.      Tenderness: There is no abdominal tenderness. There is no guarding or rebound.   Musculoskeletal:         General: No tenderness or deformity. Normal range of motion.      Cervical back: Normal range of motion and neck supple.   Skin:     General: Skin is warm and dry.      Coloration: Skin is not pale.      Findings: No erythema or rash.   Neurological:      Mental Status: She is  alert.      Cranial Nerves: No cranial nerve deficit.      Motor: No abnormal muscle tone.      Coordination: Coordination normal.      Comments: Awake and alert   Psychiatric:         Behavior: Behavior normal.         Thought Content: Thought content normal.                Significant Labs: All pertinent labs within the past 24 hours have been reviewed.    Significant Imaging: I have reviewed all pertinent imaging results/findings within the past 24 hours.    Assessment/Plan:     * Sepsis  This patient does have evidence of infective focus  My overall impression is sepsis.  Source: Urinary Tract  Antibiotics given-   Antibiotics (72h ago, onward)    Start     Stop Route Frequency Ordered    09/06/23 1030  ceFEPIme (MAXIPIME) 2 g in dextrose 5 % in water (D5W) 100 mL IVPB (MB+)         -- IV Every 8 hours (non-standard times) 09/06/23 0345        Latest lactate reviewed-  Recent Labs   Lab 09/06/23  0136   LACTATE 1.4     Organ dysfunction indicated by Acute kidney injury    Fluid challenge Actual Body weight- Patient will receive 30ml/kg actual body weight to calculate fluid bolus for treatment of septic shock.     Post- resuscitation assessment Yes Perfusion exam was performed within 6 hours of septic shock presentation after bolus shows Adequate tissue perfusion assessed by non-invasive monitoring       Will Not start Pressors- Levophed for MAP of 65  Source control achieved by: IV Cefepime    Acute cystitis without hematuria  IV Cefepime  Blood and urine culture       ARF (acute renal failure)  Patient with acute kidney injury/acute renal failure likely due to pre-renal azotemia due to dehydration TRENT is currently worsening. Baseline creatinine unknown - Labs reviewed- Renal function/electrolytes with CrCl cannot be calculated (Unknown ideal weight.). according to latest data. Monitor urine output and serial BMP and adjust therapy as needed. Avoid nephrotoxins and renally dose meds for GFR listed  "above.    Unknown baseline   Renal ultrasound  IVfs  Monitor     Hypertension  BP low normal   Hold antihypertensives       Diabetes  Patient's FSGs are controlled on current medication regimen.  Last A1c reviewed- No results found for: "LABA1C", "HGBA1C"  Most recent fingerstick glucose reviewed- No results for input(s): "POCTGLUCOSE" in the last 24 hours.  Current correctional scale  Low  Maintain anti-hyperglycemic dose as follows-   Antihyperglycemics (From admission, onward)    Start     Stop Route Frequency Ordered    09/06/23 0531  insulin aspart U-100 pen 0-5 Units         -- SubQ Before meals & nightly PRN 09/06/23 0433        Hold Oral hypoglycemics while patient is in the hospital.    Check Hgb A1c  Monitor     Metabolic acidosis  likely 2/2 dehydration and TRENT  IVFs for now   Monitor       Hyponatremia  Patient has hyponatremia which is uncontrolled,We will aim to correct the sodium by 4-6mEq in 24 hours. We will monitor sodium Daily. The hyponatremia is due to Dehydration/hypovolemia.  We will treat the hyponatremia with IV fluids as follows: NS iVfs. The patient's sodium results have been reviewed and are listed below.  Recent Labs   Lab 09/06/23  0136   *       Normocytic anemia  No hematochezia, melena, hematemesis  Iron studies  Baseline hgb unknown           VTE Risk Mitigation (From admission, onward)         Ordered     enoxaparin injection 40 mg  Daily         09/06/23 0439     IP VTE HIGH RISK PATIENT  Once         09/06/23 0433     Place sequential compression device  Until discontinued         09/06/23 0433                           Nancy Kearns NP  Department of Hospital Medicine  '[facility] - Emergency Dept.  "

## 2023-09-06 NOTE — ASSESSMENT & PLAN NOTE
This patient does have evidence of infective focus  My overall impression is sepsis.  Source: Urinary Tract  Antibiotics given-   Antibiotics (72h ago, onward)    Start     Stop Route Frequency Ordered    09/06/23 1030  ceFEPIme (MAXIPIME) 2 g in dextrose 5 % in water (D5W) 100 mL IVPB (MB+)         -- IV Every 8 hours (non-standard times) 09/06/23 0345        Latest lactate reviewed-  Recent Labs   Lab 09/06/23  0136   LACTATE 1.4     Organ dysfunction indicated by Acute kidney injury    Fluid challenge Actual Body weight- Patient will receive 30ml/kg actual body weight to calculate fluid bolus for treatment of septic shock.     Post- resuscitation assessment Yes Perfusion exam was performed within 6 hours of septic shock presentation after bolus shows Adequate tissue perfusion assessed by non-invasive monitoring       Will Not start Pressors- Levophed for MAP of 65  Source control achieved by: IV Cefepime

## 2023-09-06 NOTE — FIRST PROVIDER EVALUATION
Medical screening examination initiated.  I have conducted a focused provider triage encounter, findings are as follows:    Brief history of present illness:  Fever, recently dx with UTI    Vitals:    09/05/23 2020   BP: 136/60   BP Location: Right arm   Patient Position: Sitting   Pulse: 110   Resp: 18   Temp: (!) 102.9 °F (39.4 °C)   TempSrc: Oral   SpO2: 100%   Weight: Comment: use bed scale       Pertinent physical exam:  Febrile, pt altered according to family, VS stable    Brief workup plan:  Sepsis    Preliminary workup initiated; this workup will be continued and followed by the physician or advanced practice provider that is assigned to the patient when roomed.

## 2023-09-06 NOTE — PT/OT/SLP EVAL
Occupational Therapy Evaluation and Treatment    Name: Shannan Shin  MRN: [id number]  Admitting Diagnosis: Sepsis  Recent Surgery: * No surgery found *      Recommendations:     Discharge Recommendations: nursing facility, skilled  Level of Assistance Recommended: 24 hours significant assistance  Discharge Equipment Recommendations: to be determined by next level of care  Barriers to discharge:  (unknown)    Assessment:     Shannan Shin is a 77 y.o. female with a medical diagnosis of Sepsis. She presents with performance deficits affecting function including weakness, impaired self care skills, impaired functional mobility, impaired balance, impaired endurance.     Rehab Prognosis: Fair; patient would benefit from acute OT services to address these deficits and reach maximum level of function.    Plan:     Patient to be seen 2 x/week to address the above listed problems via self-care/home management, therapeutic activities, therapeutic exercises  Plan of Care Expires: 09/20/23  Plan of Care Reviewed with: daughter, patient    Subjective     Chief Complaint: Pt feels as if they need to go to the restroom. (Unsafe to complete at this time.)  Patient Comments/Goals: Daughter reported patient is hungry.  Pain/Comfort:  Pain Rating 1: 0/10  Pain Rating Post-Intervention 1: 0/10    Social History:  Pt daughter present. Declined use of . Poor overall ability to communicate. PLOF obtained below as able.  Living Environment: Patient lives with their son-in-law and daughter in a single story home with  no stairs.  Prior Level of Function: Prior to admission, patient  daughter reports pt was independent with ambulation, ADLs and IADLs.  Roles and Routines: Patient was not driving prior to admission.  Equipment Used at Home: none (communicated with daughter)  DME owned (not currently used): none  Assistance Upon Discharge: family    Objective:     Communicated and completed and chart review via EPIC prior to session.  Patient found supine with blood pressure cuff, PureWick, telemetry, pulse ox (continuous) upon OT entry to room.    General Precautions: Standard, fall, diabetic   Orthopedic Precautions: N/A   Braces: N/A    Respiratory Status: Room air    Occupational Performance    Gait belt applied - Yes    Bed Mobility:   Rolling/Turning to Left with maximal assistance and of 2 persons  Supine to sit from left side of bed with maximal assistance and of 2 persons  Sit to Supine with maximal assistance and of 2 persons on left side of bed    Functional Mobility/Transfers:  Sit <> Stand Transfer with maximal assistance x 2 persons with hand-held assist  Functional Mobility: Pt max assist x 2 to complete side stepping at EOB. Posterior instability throughout.  Poor ability to follow commands to improve balance this date  Seen in ER, no bedside chair present for transfer. Will initiate OOB activity as able.  Unable to transfer to BSC as none present in room. Unsafe to walk to bathroom at this time. Purewick in place and encouraged use.    Activities of Daily Living:  Upper Body Dressing: maximal assistance  Lower Body Dressing: maximal assistance    Cognitive/Visual Perceptual:  Cognitive/Psychosocial Skills: Pt daughter reported pt aware they are in the hospital. Unknown on other orientation due to poor communication via daughter.    Physical Exam:  Balance:    -     Sitting: maximal assistance and of 2 persons  -     Standing: maximal assistance and of 2 persons    AMPAC 6 Click ADL:  AMPAC Total Score: 14    Treatment & Education:  Patient educated on role of OT, POC, and goals for therapy  Questionable comprehension. Will reinforce education as able.    Patient not clear to transfer with RN/PCT.    Patient left HOB elevated with all lines intact and call button in reach.    GOALS:   Multidisciplinary Problems       Occupational Therapy Goals          Problem: Occupational Therapy    Goal Priority Disciplines Outcome  Interventions   Occupational Therapy Goal     OT, PT/OT Ongoing, Progressing    Description: Goals to be met by: 09/20/23     Patient will increase functional independence with ADLs by performing:    LE Dressing with Moderate Assistance.  Toileting from toilet with Moderate Assistance for hygiene and clothing management.   Increased functional strength to grossly by 1/2 MMT grades                         History:     Past Medical History:   Diagnosis Date    Diabetes mellitus     HTN (hypertension)     Hyperlipidemia          Past Surgical History:   Procedure Laterality Date    COLONOSCOPY N/A 2/2/2016    Procedure: COLONOSCOPY;  Surgeon: Issac Gracia MD;  Location: [facility];  Service: Endoscopy;  Laterality: N/A;       Time Tracking:     OT Date of Treatment: 09/06/23  OT Start Time: 0810  OT Stop Time: 0835  OT Total Time (min): 25 min    Billable Minutes: Evaluation 15 and Therapeutic Activity 10    NIMESH Hanson  9/6/2023

## 2023-09-06 NOTE — PT/OT/SLP EVAL
Physical Therapy Evaluation    Patient Name:  Shannan Shin   MRN:  [id number]    Recommendations:     Discharge Recommendations: nursing facility, skilled   Discharge Equipment Recommendations: to be determined by next level of care   Barriers to discharge: None    Assessment:     Shannan Shin is a 77 y.o. female admitted with a medical diagnosis of Sepsis.  She presents with the following impairments/functional limitations: weakness, impaired endurance, impaired functional mobility, gait instability, impaired balance, decreased safety awareness, decreased lower extremity function, decreased upper extremity function, decreased coordination.    Rehab Prognosis: Fair; patient would benefit from acute skilled PT services to address these deficits and reach maximum level of function.    Recent Surgery: * No surgery found *     Plan:     During this hospitalization, patient to be seen 3 x/week to address the identified rehab impairments via gait training, therapeutic activities, therapeutic exercises and progress toward the following goals:    Plan of Care Expires:  09/20/23    Subjective     Chief Complaint: NONE, LANGUAGE BARRIER-DAUGHTER PRESENT FOR TRANSLATION  Patient/Family Comments/goals:   Pain/Comfort:  Pain Rating 1: 0/10    Patients cultural, spiritual, Sabianist conflicts given the current situation:      Living Environment:  ALL HISTORY OBTAINED FROM DAUGHTER PRESENT:  PT LIVES WITH DAUGHTER AND HER FAMILY, DAUGHTER REPORTS SHE IS ABLE TO PROVIDE 24 HOUR CARE IF NEEDED, 1 STORY HOUSE NO STEPS, AMB INDEP COMMUNITY DISTANCES, DOES NOT DRIVE, INDEP WITH ADL'S  Prior to admission, patients level of function was INDEP.  Equipment used at home: none (PER DAUGHTER).  DME owned (not currently used): none.  Upon discharge, patient will have assistance from FAMILY.    Objective:     Communicated with [facility] prior to session.  Patient found supine with telemetry, pulse ox (continuous), blood pressure cuff, peripheral  "IV, PureWick  upon PT entry to room.    General Precautions: Standard, fall  Orthopedic Precautions:N/A   Braces: N/A  Respiratory Status: Room air    Exams:  Cognitive Exam:  Patient is oriented to DIFFICULT TO ASSESS FULLY DUE TO LANGUAGE BARRIER BUT PT APPEARS TO BE ANSWERING QUESTIONS FROM DAUGHTER AS WELL AS FOLLOWING COMMANDS WITH MAX ENCOURAGEMENT  Postural Exam:  Patient presented with the following abnormalities:    -       Rounded shoulders  -       Forward head  RLE ROM: WFL  RLE Strength: GROSSLY 3/5  LLE ROM: WFL  LLE Strength: GROSSLY 3/5    Functional Mobility:  Bed Mobility:     Rolling Left:  maximal assistance and of 2 persons  Rolling Right: maximal assistance and of 2 persons  Scooting: maximal assistance and of 2 persons  Supine to Sit: maximal assistance and of 2 persons  Sit to Supine: maximal assistance and of 2 persons  Transfers:     Sit to Stand:  maximal assistance and of 2 persons with no AD and hand-held assist  Gait: PT TOOK APPROX. 4 SMALL SIDE STEPS TO MOVE TOWARD HOB WITH MAXA X 2, QUICK TO FATIGUE, CUES FOR UPRIGHT POSTURE  Balance: POOR SITTING AND STANDING BALANCE, PT STOOD IN FRONT OF BED FOR APPROX. 3 MINUTES LEANING ONTO THERAPIST MAXA X 2    AM-PAC 6 CLICK MOBILITY  Total Score:11     Treatment & Education:  PT AND DAUGHTER EDUCATION:  - ROLE OF P.T. AND POC IN ACUTE CARE HOSPITAL SETTING  - ENCOURAGED TO INCREASE TIME OOB IN CHAIR TO TOLERANCE   - EDUCATED IN AND ENCOURAGED TO CONTINUE THERAPUETIC EXERCISES THROUGHOUT THE DAY TO INCREASE ACTIVITY TOLERANCE AND DECREASE RISK FOR PNEUMONIA AND BLOOD CLOTS: HIP FLEX/EXT, HIP ABD/ADD, QUAD SET, HEEL SLIDE, AP  - RISK FOR FALLS DUE TO GENERALIZED WEAKNESS, EDUCATED ON "CALL DON'T FALL", ENCOURAGED TO CALL FOR ASSISTANCE WITH ALL NEEDS    Patient left HOB elevated with all lines intact, call button in reach, and NURSE notified.    GOALS:   Multidisciplinary Problems       Physical Therapy Goals          Problem: Physical Therapy "    Goal Priority Disciplines Outcome Goal Variances Interventions   Physical Therapy Goal     PT, PT/OT      Description: LTG'S TO BE MET IN 14 DAYS (9-20-23)  PT WILL REQUIRE MODA FOR BED MOBILITY  PT WILL REQUIRE MODA FOR BED<>CHAIR TF'S  PT WILL  FEET WITH OR WITHOUT RW AND MODA  PT WILL INC AMPAC SCORE BY 2 POINTS TO PROGRESS GROSS FUNC MOBILITY                         History:     Past Medical History:   Diagnosis Date    Diabetes mellitus     HTN (hypertension)     Hyperlipidemia        Past Surgical History:   Procedure Laterality Date    COLONOSCOPY N/A 2/2/2016    Procedure: COLONOSCOPY;  Surgeon: Issac Gracia MD;  Location[facility];  Service: Endoscopy;  Laterality: N/A;       Time Tracking:     PT Received On: 09/06/23  PT Start Time: 0800     PT Stop Time: 0825  PT Total Time (min): 25 min     Billable Minutes: Evaluation 15 and Therapeutic Activity 10    09/06/2023

## 2023-09-06 NOTE — ED PROVIDER NOTES
SCRIBE #1 NOTE: I, Payal Parry, am scribing for, and in the presence of, Ronn Menendez MD. I have scribed the entire note.       History     Chief Complaint   Patient presents with    Fever     C/o fever and constipation x's 2 days. Also c/o fatigue. Dx. W/ UTI 2 days ago at . Was taking cipro and pyridium x's 2 days.     Review of patient's allergies indicates:  No Known Allergies      History of Present Illness     HPI    9/6/2023, 1:33 AM  History obtained from the patient  Additional history obtained from independent historian: patient's grandson at bedside      History of Present Illness: Shannan Shin is a 77 y.o. female patient with a PMHx of DM, HTN, and HLD who presents to the Emergency Department for evaluation of fever (T now 102.9°F) which onset 2 days PTA. The pt's grandson reports that the pt went to  2 days ago, Dx with a UTI, and was Rx Cipro and Pyridium. The pt reports no relief to her sxs. The pt denies any sick contacts. Symptoms are constant and moderate in severity. No mitigating or exacerbating factors reported. Associated sxs include generalized myalgias and fatigue. Patient denies any CP, SOB, cough, rhinorrhea, congestion, sore throat, N/V, and all other sxs at this time. No further complaints or concerns at this time.       Arrival mode: Personal vehicle    PCP: Danny Jackson MD        Past Medical History:  Past Medical History:   Diagnosis Date    Diabetes mellitus     HTN (hypertension)     Hyperlipidemia        Past Surgical History:  Past Surgical History:   Procedure Laterality Date    COLONOSCOPY N/A 2/2/2016    Procedure: COLONOSCOPY;  Surgeon: Issac Gracia MD;  Location: [facility];  Service: Endoscopy;  Laterality: N/A;         Family History:  No family history on file.    Social History:  Social History     Tobacco Use    Smoking status: Former    Smokeless tobacco: Current    Tobacco comments:     quit 30-40  yrs ago   Substance and Sexual Activity    Alcohol use:  No     Alcohol/week: 0.0 standard drinks of alcohol    Drug use: No    Sexual activity: Not on file        Review of Systems     Review of Systems   Constitutional:  Positive for fatigue and fever (T now 102.9°F).   HENT:  Negative for congestion, rhinorrhea and sore throat.    Respiratory:  Negative for cough and shortness of breath.    Cardiovascular:  Negative for chest pain.   Gastrointestinal:  Negative for nausea and vomiting.   Genitourinary:  Negative for dysuria.   Musculoskeletal:  Positive for myalgias (generalized). Negative for back pain.   Skin:  Negative for rash.   Neurological:  Negative for weakness.   Hematological:  Does not bruise/bleed easily.   All other systems reviewed and are negative.     Physical Exam     Initial Vitals [09/05/23 2020]   BP Pulse Resp Temp SpO2   136/60 110 18 (!) 102.9 °F (39.4 °C) 100 %      MAP       --          Physical Exam  Nursing Notes and Vital Signs Reviewed.  Constitutional: Patient is in no acute distress. Febrile. Well-developed and well-nourished.  Head: Atraumatic. Normocephalic.  Eyes: PERRL. EOM intact. Conjunctivae are not pale. No scleral icterus.  ENT: Mucous membranes are moist. Oropharynx is clear and symmetric.    Neck: Supple. Full ROM. No lymphadenopathy.  Cardiovascular: Regular rate. Regular rhythm. Murmur in the right 2nd intercostal space and the left 4th intercostal space. No rubs or gallops. Distal pulses are 2+ and symmetric.  Pulmonary/Chest: No respiratory distress. Clear to auscultation bilaterally. No wheezing or rales.  Abdominal: Soft and non-distended.  There is no tenderness.  No rebound, guarding, or rigidity. Good bowel sounds.  Genitourinary: No CVA tenderness  Musculoskeletal: Moves all extremities. No obvious deformities. No edema. No calf tenderness.  Skin: Warm and dry.  Neurological:  Alert, awake, and appropriate.  Normal speech.  No acute focal neurological deficits are appreciated.  Psychiatric: Normal affect. Good eye  contact. Appropriate in content.     ED Course   Procedures  ED Vital Signs:  Vitals:    09/05/23 2020 09/06/23 0130 09/06/23 0157 09/06/23 0200   BP: 136/60 130/60     Pulse: 110 96 97    Resp: 18 (!) 26     Temp: (!) 102.9 °F (39.4 °C)   100 °F (37.8 °C)   TempSrc: Oral      SpO2: 100% 100%     Weight:       Height:        09/06/23 0215 09/06/23 0230 09/06/23 0247 09/06/23 0315   BP: 132/60 (!) 108/54 (!) 107/55 (!) 100/52   Pulse: 102 101 97 99   Resp: (!) 24 (!) 27 (!) 26 (!) 25   Temp:       TempSrc:       SpO2: 100% 98% 96% 96%   Weight:   36.6 kg (80 lb 9.6 oz)    Height:        09/06/23 0345 09/06/23 0415 09/06/23 0445 09/06/23 0500   BP: (!) 91/51 (!) 107/55 (!) 89/52 (!) 90/54   Pulse: 92 96 84 84   Resp: (!) 27 (!) 25 (!) 24 (!) 25   Temp:  98.5 °F (36.9 °C)     TempSrc:       SpO2: 95% 100% 96% 96%   Weight:       Height:    5' (1.524 m)    09/06/23 0515 09/06/23 0530   BP: (!) 96/50 (!) 108/53   Pulse: 84 86   Resp: (!) 25 (!) 24   Temp:     TempSrc:     SpO2: 97% 99%   Weight:     Height:         Abnormal Lab Results:  Labs Reviewed   COMPREHENSIVE METABOLIC PANEL - Abnormal; Notable for the following components:       Result Value    Sodium 131 (*)     CO2 16 (*)     Glucose 111 (*)     BUN 25 (*)     Creatinine 2.2 (*)     Albumin 3.3 (*)     eGFR 23 (*)     All other components within normal limits    Narrative:     Release to patient->Immediate   URINALYSIS, REFLEX TO URINE CULTURE - Abnormal; Notable for the following components:    Appearance, UA Hazy (*)     Protein, UA 2+ (*)     Ketones, UA 1+ (*)     Occult Blood UA 2+ (*)     Leukocytes, UA 3+ (*)     All other components within normal limits    Narrative:     Specimen Source->Urine   PROCALCITONIN - Abnormal; Notable for the following components:    Procalcitonin 27.92 (*)     All other components within normal limits    Narrative:     Release to patient->Immediate   URINALYSIS MICROSCOPIC - Abnormal; Notable for the following components:     RBC, UA 9 (*)     WBC, UA >100 (*)     WBC Clumps, UA Many (*)     Bacteria Moderate (*)     Hyaline Casts, UA 2 (*)     All other components within normal limits    Narrative:     Specimen Source->Urine   CBC W/ AUTO DIFFERENTIAL - Abnormal; Notable for the following components:    WBC 16.23 (*)     RBC 2.94 (*)     Hemoglobin 9.1 (*)     Hematocrit 27.4 (*)     RDW 11.4 (*)     Gran % 79.0 (*)     Lymph % 8.0 (*)     All other components within normal limits   COMPREHENSIVE METABOLIC PANEL - Abnormal; Notable for the following components:    Sodium 132 (*)     CO2 13 (*)     Glucose 113 (*)     BUN 26 (*)     Creatinine 1.9 (*)     Calcium 8.0 (*)     Total Protein 5.9 (*)     Albumin 2.7 (*)     Alkaline Phosphatase 51 (*)     eGFR 27 (*)     All other components within normal limits   MAGNESIUM - Abnormal; Notable for the following components:    Magnesium 1.3 (*)     All other components within normal limits   CBC W/ AUTO DIFFERENTIAL - Abnormal; Notable for the following components:    RBC 2.53 (*)     Hemoglobin 7.7 (*)     Hematocrit 24.3 (*)     MCHC 31.7 (*)     RDW 11.4 (*)     All other components within normal limits   FERRITIN - Abnormal; Notable for the following components:    Ferritin 307 (*)     All other components within normal limits   INFLUENZA A & B BY MOLECULAR   INFLUENZA A & B BY MOLECULAR   CULTURE, BLOOD   CULTURE, BLOOD   CULTURE, URINE   SARS-COV-2 RNA AMPLIFICATION, QUAL   HIV 1 / 2 ANTIBODY    Narrative:     Release to patient->Immediate   HEPATITIS C ANTIBODY    Narrative:     Release to patient->Immediate   HEP C VIRUS HOLD SPECIMEN    Narrative:     Release to patient->Immediate   LACTIC ACID, PLASMA    Narrative:     Release to patient->Immediate   TROPONIN I    Narrative:     Release to patient->Immediate   SARS-COV-2 RNA AMPLIFICATION, QUAL   B-TYPE NATRIURETIC PEPTIDE   LACTIC ACID, PLASMA   PHOSPHORUS   FERRITIN   FOLATE   HEMOGLOBIN A1C   IRON AND TIBC   T4, FREE   TSH    VITAMIN B12   TSH   HEMOGLOBIN A1C   VITAMIN B12   FOLATE   IRON AND TIBC   POCT GLUCOSE, HAND-HELD DEVICE        All Lab Results:  Results for orders placed or performed during the hospital encounter of 09/06/23   Influenza A & B by Molecular    Specimen: Nasopharyngeal Swab   Result Value Ref Range    Influenza A, Molecular Negative Negative    Influenza B, Molecular Negative Negative    Flu A & B Source Nasal swab    Influenza A & B by Molecular    Specimen: Nasopharyngeal Swab   Result Value Ref Range    Influenza A, Molecular Negative Negative    Influenza B, Molecular Negative Negative    Flu A & B Source Nasal swab    COVID-19 Rapid Screening   Result Value Ref Range    SARS-CoV-2 RNA, Amplification, Qual Negative Negative   HIV 1/2 Ag/Ab (4th Gen)   Result Value Ref Range    HIV 1/2 Ag/Ab Negative Negative   Hepatitis C Antibody   Result Value Ref Range    Hepatitis C Ab Negative Negative   HCV Virus Hold Specimen   Result Value Ref Range    HEP C Virus Hold Specimen Hold for HCV sendout    Comprehensive metabolic panel   Result Value Ref Range    Sodium 131 (L) 136 - 145 mmol/L    Potassium 4.7 3.5 - 5.1 mmol/L    Chloride 101 95 - 110 mmol/L    CO2 16 (L) 23 - 29 mmol/L    Glucose 111 (H) 70 - 110 mg/dL    BUN 25 (H) 8 - 23 mg/dL    Creatinine 2.2 (H) 0.5 - 1.4 mg/dL    Calcium 9.2 8.7 - 10.5 mg/dL    Total Protein 7.2 6.0 - 8.4 g/dL    Albumin 3.3 (L) 3.5 - 5.2 g/dL    Total Bilirubin 0.5 0.1 - 1.0 mg/dL    Alkaline Phosphatase 60 55 - 135 U/L    AST 24 10 - 40 U/L    ALT 13 10 - 44 U/L    eGFR 23 (A) >60 mL/min/1.73 m^2    Anion Gap 14 8 - 16 mmol/L   Lactic acid, plasma #1   Result Value Ref Range    Lactate (Lactic Acid) 1.4 0.5 - 2.2 mmol/L   Urinalysis, Reflex to Urine Culture Urine, Clean Catch    Specimen: Urine   Result Value Ref Range    Specimen UA Urine, Clean Catch     Color, UA Yellow Yellow, Straw, Anny    Appearance, UA Hazy (A) Clear    pH, UA 6.0 5.0 - 8.0    Specific [place], UA 1.010  1.005 - 1.030    Protein, UA 2+ (A) Negative    Glucose, UA Negative Negative    Ketones, UA 1+ (A) Negative    Bilirubin (UA) Negative Negative    Occult Blood UA 2+ (A) Negative    Nitrite, UA Negative Negative    Urobilinogen, UA Negative <2.0 EU/dL    Leukocytes, UA 3+ (A) Negative   Troponin I   Result Value Ref Range    Troponin I <0.006 0.000 - 0.026 ng/mL   Procalcitonin   Result Value Ref Range    Procalcitonin 27.92 (H) <0.25 ng/mL   COVID-19 Rapid Screening   Result Value Ref Range    SARS-CoV-2 RNA, Amplification, Qual Negative Negative   Urinalysis Microscopic   Result Value Ref Range    RBC, UA 9 (H) 0 - 4 /hpf    WBC, UA >100 (H) 0 - 5 /hpf    WBC Clumps, UA Many (A) None-Rare    Bacteria Moderate (A) None-Occ /hpf    Squam Epithel, UA 1 /hpf    Hyaline Casts, UA 2 (A) 0-1/lpf /lpf    Microscopic Comment SEE COMMENT    CBC auto differential   Result Value Ref Range    WBC 16.23 (H) 3.90 - 12.70 K/uL    RBC 2.94 (L) 4.00 - 5.40 M/uL    Hemoglobin 9.1 (L) 12.0 - 16.0 g/dL    Hematocrit 27.4 (L) 37.0 - 48.5 %    MCV 93 82 - 98 fL    MCH 31.0 27.0 - 31.0 pg    MCHC 33.2 32.0 - 36.0 g/dL    RDW 11.4 (L) 11.5 - 14.5 %    Platelets 195 150 - 450 K/uL    MPV 10.1 9.2 - 12.9 fL    Immature Granulocytes CANCELED 0.0 - 0.5 %    Immature Grans (Abs) CANCELED 0.00 - 0.04 K/uL    nRBC 0 0 /100 WBC    Gran % 79.0 (H) 38.0 - 73.0 %    Lymph % 8.0 (L) 18.0 - 48.0 %    Mono % 4.0 4.0 - 15.0 %    Eosinophil % 0.0 0.0 - 8.0 %    Basophil % 0.0 0.0 - 1.9 %    Bands 9.0 %    Poly Occasional     Dohle Bodies Present     Differential Method Manual    Brain natriuretic peptide   Result Value Ref Range    BNP 97 0 - 99 pg/mL   Lactic acid, plasma #2   Result Value Ref Range    Lactate (Lactic Acid) 0.7 0.5 - 2.2 mmol/L   Comprehensive Metabolic Panel (CMP)   Result Value Ref Range    Sodium 132 (L) 136 - 145 mmol/L    Potassium 4.0 3.5 - 5.1 mmol/L    Chloride 107 95 - 110 mmol/L    CO2 13 (L) 23 - 29 mmol/L    Glucose 113 (H)  70 - 110 mg/dL    BUN 26 (H) 8 - 23 mg/dL    Creatinine 1.9 (H) 0.5 - 1.4 mg/dL    Calcium 8.0 (L) 8.7 - 10.5 mg/dL    Total Protein 5.9 (L) 6.0 - 8.4 g/dL    Albumin 2.7 (L) 3.5 - 5.2 g/dL    Total Bilirubin 0.4 0.1 - 1.0 mg/dL    Alkaline Phosphatase 51 (L) 55 - 135 U/L    AST 23 10 - 40 U/L    ALT 12 10 - 44 U/L    eGFR 27 (A) >60 mL/min/1.73 m^2    Anion Gap 12 8 - 16 mmol/L   Magnesium   Result Value Ref Range    Magnesium 1.3 (L) 1.6 - 2.6 mg/dL   Phosphorus   Result Value Ref Range    Phosphorus 3.8 2.7 - 4.5 mg/dL   CBC with Automated Differential   Result Value Ref Range    WBC 8.62 3.90 - 12.70 K/uL    RBC 2.53 (L) 4.00 - 5.40 M/uL    Hemoglobin 7.7 (L) 12.0 - 16.0 g/dL    Hematocrit 24.3 (L) 37.0 - 48.5 %    MCV 96 82 - 98 fL    MCH 30.4 27.0 - 31.0 pg    MCHC 31.7 (L) 32.0 - 36.0 g/dL    RDW 11.4 (L) 11.5 - 14.5 %    Platelets 160 150 - 450 K/uL    MPV 9.6 9.2 - 12.9 fL   TSH   Result Value Ref Range    TSH 0.712 0.400 - 4.000 uIU/mL   Ferritin   Result Value Ref Range    Ferritin 307 (H) 20.0 - 300.0 ng/mL         Imaging Results:  Imaging Results              X-Ray Chest AP Portable (Final result)  Result time 09/05/23 20:58:54      Final result by Melvin Martin MD (09/05/23 20:58:54)                   Impression:      No acute abnormality.      Electronically signed by: Melvin Martin  Date:    09/05/2023  Time:    20:58               Narrative:    EXAMINATION:  XR CHEST AP PORTABLE    CLINICAL HISTORY:  Sepsis;    TECHNIQUE:  Single frontal view of the chest was performed.    COMPARISON:  12/27/2017.    FINDINGS:  The lungs are clear, with normal appearance of pulmonary vasculature and no pleural effusion or pneumothorax.    The cardiac silhouette is normal in size. The hilar and mediastinal contours are unremarkable.    Bones are intact.                                       The EKG was ordered, reviewed, and independently interpreted by the ED provider.  Interpretation time: 01:50  Rate: 98  BPM  Rhythm: normal sinus rhythm  Interpretation: No acute ST changes. No STEMI.           The Emergency Provider reviewed the vital signs and test results, which are outlined above.     ED Discussion     3:56 AM: Discussed case with Nancy Kearns NP ([facility]). Dr. Nj agrees with current care and management of pt and accepts admission.   Admitting Service: [facility]  Admitting Physician: Dr. Nj  Admit to: Inpatient med/tele    3:57 AM: Re-evaluated pt. I have discussed test results, shared treatment plan, and the need for admission with patient and family at bedside. Pt and family express understanding at this time and agree with all information. All questions answered. Pt and family have no further questions or concerns at this time. Pt is ready for admit.         Medical Decision Making  Amount and/or Complexity of Data Reviewed  Labs: ordered. Decision-making details documented in ED Course.  Radiology: ordered. Decision-making details documented in ED Course.  ECG/medicine tests: ordered. Decision-making details documented in ED Course.    Risk  OTC drugs.  Decision regarding hospitalization.                ED Medication(s):  Medications   ceFEPIme (MAXIPIME) 1 g in dextrose 5 % in water (D5W) 100 mL IVPB (MB+) (has no administration in time range)   0.9%  NaCl infusion ( Intravenous New Bag 9/6/23 0422)   sodium chloride 0.9% flush 10 mL (has no administration in time range)   albuterol-ipratropium 2.5 mg-0.5 mg/3 mL nebulizer solution 3 mL (has no administration in time range)   melatonin tablet 6 mg (has no administration in time range)   ondansetron injection 4 mg (has no administration in time range)   prochlorperazine injection Soln 5 mg (has no administration in time range)   polyethylene glycol packet 17 g (has no administration in time range)   simethicone chewable tablet 80 mg (has no administration in time range)   aluminum-magnesium hydroxide-simethicone 200-200-20 mg/5  mL suspension 30 mL (has no administration in time range)   acetaminophen tablet 650 mg (has no administration in time range)   naloxone 0.4 mg/mL injection 0.02 mg (has no administration in time range)   potassium bicarbonate disintegrating tablet 50 mEq (has no administration in time range)   potassium bicarbonate disintegrating tablet 35 mEq (has no administration in time range)   potassium bicarbonate disintegrating tablet 60 mEq (has no administration in time range)   magnesium oxide tablet 800 mg (has no administration in time range)   magnesium oxide tablet 800 mg (has no administration in time range)   potassium, sodium phosphates 280-160-250 mg packet 2 packet (has no administration in time range)   potassium, sodium phosphates 280-160-250 mg packet 2 packet (has no administration in time range)   potassium, sodium phosphates 280-160-250 mg packet 2 packet (has no administration in time range)   glucose chewable tablet 16 g (has no administration in time range)   glucose chewable tablet 24 g (has no administration in time range)   glucagon (human recombinant) injection 1 mg (has no administration in time range)   insulin aspart U-100 pen 0-5 Units (has no administration in time range)   dextrose 10% bolus 125 mL 125 mL (has no administration in time range)   dextrose 10% bolus 250 mL 250 mL (has no administration in time range)   enoxaparin injection 30 mg (has no administration in time range)   sodium chloride 0.9% bolus 500 mL 500 mL (500 mLs Intravenous New Bag 9/6/23 0515)   acetaminophen tablet 650 mg (650 mg Oral Given 9/6/23 0200)   sodium chloride 0.9% bolus 1,000 mL 1,000 mL (0 mLs Intravenous Stopped 9/6/23 0256)   ceFEPIme (MAXIPIME) 2 g in dextrose 5 % in water (D5W) 100 mL IVPB (MB+) (0 g Intravenous Stopped 9/6/23 0315)   sodium chloride 0.9% bolus 500 mL 500 mL (0 mLs Intravenous Stopped 9/6/23 0500)       New Prescriptions    No medications on file               Scribe Attestation:   Scribe  #1: I performed the above scribed service and the documentation accurately describes the services I performed. I attest to the accuracy of the note.     Attending:   Physician Attestation Statement for Scribe #1: I, Ronn Menendez MD, personally performed the services described in this documentation, as scribed by Payal Parry, in my presence, and it is both accurate and complete.           Clinical Impression       ICD-10-CM ICD-9-CM   1. Weakness  R53.1 780.79   2. Sepsis due to urinary tract infection  A41.9 038.9    N39.0 995.91     599.0   3. Chest pain  R07.9 786.50       Disposition:   Disposition: Admitted  Condition: Fair         Ronn Menendez MD  09/06/23 0605

## 2023-09-06 NOTE — PLAN OF CARE
OT eval completed. Sup > EOB Max A x 2, EOB > stand Max A x2, Static standing x 3 mins Max A x2, Sit>Sup Max A x2. D/C rec: SN[facility]

## 2023-09-06 NOTE — SUBJECTIVE & OBJECTIVE
Past Medical History:   Diagnosis Date    Diabetes mellitus     HTN (hypertension)     Hyperlipidemia        Past Surgical History:   Procedure Laterality Date    COLONOSCOPY N/A 2/2/2016    Procedure: COLONOSCOPY;  Surgeon: Issac Gracia MD;  Location: [facility];  Service: Endoscopy;  Laterality: N/A;       Review of patient's allergies indicates:  No Known Allergies    No current facility-administered medications on file prior to encounter.     Current Outpatient Medications on File Prior to Encounter   Medication Sig    amlodipine (NORVASC) 5 MG tablet Take 5 mg by mouth once daily.    FOLIC ACID/MULTIVIT-MIN/LUTEIN (CENTRUM SILVER ORAL) Take 1 tablet by mouth once daily.    glyBURIDE micronized (GLYNASE) 3 MG Tab Take 1.5 mg by mouth daily with breakfast.    losartan (COZAAR) 100 MG tablet Take 100 mg by mouth once daily.    lovastatin (MEVACOR) 20 MG tablet Take 20 mg by mouth every evening.    metformin (GLUCOPHAGE) 850 MG tablet Take 850 mg by mouth 2 (two) times daily with meals.    neomycin-polymyxin-hydrocortisone (CORTISPORIN) 3.5-10,000-1 mg/mL-unit/mL-% otic suspension Place 4 drops into the right ear 3 (three) times daily.    omeprazole (PRILOSEC) 20 MG capsule Take 1 capsule (20 mg total) by mouth 2 (two) times daily.     Family History    None       Tobacco Use    Smoking status: Former    Smokeless tobacco: Current    Tobacco comments:     quit 30-40  yrs ago   Substance and Sexual Activity    Alcohol use: No     Alcohol/week: 0.0 standard drinks of alcohol    Drug use: No    Sexual activity: Not on file     Review of Systems   Constitutional:  Positive for activity change, fatigue and fever. Negative for appetite change, chills, diaphoresis and unexpected weight change.   HENT:  Negative for congestion, nosebleeds, sinus pressure and sore throat.    Eyes:  Negative for pain, discharge and visual disturbance.   Respiratory:  Negative for cough, chest tightness, shortness of breath, wheezing  and stridor.    Cardiovascular:  Negative for chest pain, palpitations and leg swelling.   Gastrointestinal:  Negative for abdominal distention, abdominal pain, blood in stool, constipation, diarrhea, nausea and vomiting.   Endocrine: Negative for cold intolerance and heat intolerance.   Genitourinary:  Positive for dysuria. Negative for difficulty urinating, flank pain, frequency and urgency.   Musculoskeletal:  Negative for arthralgias, back pain, joint swelling, myalgias, neck pain and neck stiffness.   Skin:  Negative for rash and wound.   Allergic/Immunologic: Negative for food allergies and immunocompromised state.   Neurological:  Positive for weakness (generalized). Negative for dizziness, seizures, syncope, facial asymmetry, speech difficulty, light-headedness, numbness and headaches.   Hematological:  Negative for adenopathy.   Psychiatric/Behavioral:  Negative for agitation, confusion and hallucinations.      Objective:     Vital Signs (Most Recent):  Temp: 98.5 °F (36.9 °C) (09/06/23 0415)  Pulse: 96 (09/06/23 0415)  Resp: (!) 25 (09/06/23 0415)  BP: (!) 107/55 (09/06/23 0415)  SpO2: 100 % (09/06/23 0415) Vital Signs (24h Range):  Temp:  [98.5 °F (36.9 °C)-102.9 °F (39.4 °C)] 98.5 °F (36.9 °C)  Pulse:  [] 96  Resp:  [18-27] 25  SpO2:  [95 %-100 %] 100 %  BP: ()/(51-60) 107/55     Weight: 36.6 kg (80 lb 9.6 oz)  Body mass index is 15.74 kg/m².     Physical Exam  Vitals and nursing note reviewed.   Constitutional:       General: She is not in acute distress.     Appearance: She is underweight. She is not diaphoretic.      Comments: Thin, small framed, frail appearing    HENT:      Head: Normocephalic and atraumatic.      Nose: Nose normal.   Eyes:      General: No scleral icterus.     Conjunctiva/sclera: Conjunctivae normal.   Neck:      Trachea: No tracheal deviation.   Cardiovascular:      Rate and Rhythm: Normal rate and regular rhythm.      Heart sounds: Normal heart sounds. No murmur  heard.     No friction rub. No gallop.   Pulmonary:      Effort: Pulmonary effort is normal. No respiratory distress.      Breath sounds: Normal breath sounds. No stridor. No wheezing or rales.   Chest:      Chest wall: No tenderness.   Abdominal:      General: Bowel sounds are normal. There is no distension.      Palpations: Abdomen is soft. There is no mass.      Tenderness: There is no abdominal tenderness. There is no guarding or rebound.   Musculoskeletal:         General: No tenderness or deformity. Normal range of motion.      Cervical back: Normal range of motion and neck supple.   Skin:     General: Skin is warm and dry.      Coloration: Skin is not pale.      Findings: No erythema or rash.   Neurological:      Mental Status: She is alert.      Cranial Nerves: No cranial nerve deficit.      Motor: No abnormal muscle tone.      Coordination: Coordination normal.      Comments: Awake and alert   Psychiatric:         Behavior: Behavior normal.         Thought Content: Thought content normal.                Significant Labs: All pertinent labs within the past 24 hours have been reviewed.    Significant Imaging: I have reviewed all pertinent imaging results/findings within the past 24 hours.

## 2023-09-06 NOTE — PROGRESS NOTES
Pharmacist Renal Dose Adjustment Note    Shannan Shin is a 77 y.o. female being treated with the medication enoxaparin    Patient Data:    Vital Signs (Most Recent):  Temp: 98.5 °F (36.9 °C) (09/06/23 0415)  Pulse: 84 (09/06/23 0500)  Resp: (!) 25 (09/06/23 0500)  BP: (!) 90/54 (09/06/23 0500)  SpO2: 96 % (09/06/23 0500) Vital Signs (72h Range):  Temp:  [98.5 °F (36.9 °C)-102.9 °F (39.4 °C)]   Pulse:  []   Resp:  [18-27]   BP: ()/(51-60)   SpO2:  [95 %-100 %]      Recent Labs   Lab 09/06/23 0136   CREATININE 2.2*     Serum creatinine: 2.2 mg/dL (H) 09/06/23 0136  Estimated creatinine clearance: 12.6 mL/min (A)    Enoxaparin 40 mg q24h will be changed to enoxaparin 30 mg q24h for CrCl <30 mL/min.     Pharmacist's Name: Deanna Levi  Pharmacist's Extension: [phone]

## 2023-09-06 NOTE — ASSESSMENT & PLAN NOTE
"Patient's FSGs are controlled on current medication regimen.  Last A1c reviewed- No results found for: "LABA1C", "HGBA1C"  Most recent fingerstick glucose reviewed- No results for input(s): "POCTGLUCOSE" in the last 24 hours.  Current correctional scale  Low  Maintain anti-hyperglycemic dose as follows-   Antihyperglycemics (From admission, onward)    Start     Stop Route Frequency Ordered    09/06/23 0531  insulin aspart U-100 pen 0-5 Units         -- SubQ Before meals & nightly PRN 09/06/23 0433        Hold Oral hypoglycemics while patient is in the hospital.    Check Hgb A1c  Monitor   "

## 2023-09-06 NOTE — HPI
The patient is a 76 yo Ethiopian female with DM, HTN, and HLD who presents to ED with Fever, generalized weakness, and fatigue x 2 days. The pt's son reported the pt also complained of dysuria for approx one week. She was seen at  2 days ago for her symptoms and was diagnosed with a UTI discharged on Cipro and Pyridium without improvement.     In the ED, Temp 102.9F, mild tachycardia and tachypnea, BP low normal. Labs showed WBC 16 with 9% bands, Hgb 9.1 (unsure of baseline), Cr 2.2 (unsure of baseline), Na 131, Bicarb 16, LA normal, procalcitonin 27. +UTI. COVID/FLU negative. CXR- clear.

## 2023-09-06 NOTE — PLAN OF CARE
P.T. EVAL COMPLETE.  PT CURRENTLY REQUIRES MAXA X 2 FOR BED MOBILITY, SIT<>STAND TF, AND FOR TAKING SIDE STEPS.  P.T. RECOMMENDS SNF AT D/C

## 2023-09-06 NOTE — ASSESSMENT & PLAN NOTE
Patient with acute kidney injury/acute renal failure likely due to pre-renal azotemia due to dehydration TRENT is currently worsening. Baseline creatinine unknown - Labs reviewed- Renal function/electrolytes with CrCl cannot be calculated (Unknown ideal weight.). according to latest data. Monitor urine output and serial BMP and adjust therapy as needed. Avoid nephrotoxins and renally dose meds for GFR listed above.    Unknown baseline   Renal ultrasound  IVfs  Monitor

## 2023-09-06 NOTE — CARE UPDATE
77 year old female, under the care of Hospital Medicine for management of UTI/Sepsis.   Offered  services with wendy MCNAMARA at bedside refused.     Hgb decreased to 7.7 this AM from 9.1 on admission. Unclear baseline, Last Hgb on record from 5 years ago was normal. Significant symptoms of anemia including: tachycardia, worsening fatigue, BOYCE.   Vitals otherwise stable, Afebrile since administration of tylenol on admission.     Discussed risks of PRBC transfusion with son, including transfusion reaction. Son stated understanding, agreed to proceed with PRBC transfusion.     UC: pending  BC: pending    New Orders:  --Type and screen  --1U PRBC    Critical care time spent on the evaluation and treatment of severe organ dysfunction, review of pertinent labs and imaging studies, discussions with consulting providers and discussions with patient/family: 30 minutes.

## 2023-09-06 NOTE — PLAN OF CARE
Discussed poc with pt, pt verbalized understanding    Purposeful rounding every 2hours    VS wnl; elevated temp- given PRN tylenol  Cardiac monitoring in use, pt is NSR, tele monitor # ____  Blood glucose monitoring   Fall precautions in place, remains injury free      Accurate I&Os    Bed locked at lowest position  Call light within reach    Chart check complete  Will cont with POC

## 2023-09-06 NOTE — ASSESSMENT & PLAN NOTE
Patient has hyponatremia which is uncontrolled,We will aim to correct the sodium by 4-6mEq in 24 hours. We will monitor sodium Daily. The hyponatremia is due to Dehydration/hypovolemia.  We will treat the hyponatremia with IV fluids as follows: NS iVfs. The patient's sodium results have been reviewed and are listed below.  Recent Labs   Lab 09/06/23  0136   *

## 2023-09-06 NOTE — PHARMACY MED REC
"Admission Medication History     The home medication history was taken by Dara Fink.    You may go to "Admission" then "Reconcile Home Medications" tabs to review and/or act upon these items.     The home medication list has been updated by the Pharmacy department.   Please read ALL comments highlighted in yellow.   Please address this information as you see fit.    Feel free to contact us if you have any questions or require assistance.      The medications listed below were removed from the home medication list. Please reorder if appropriate:  Patient reports no longer taking the following medication(s):  CORTISPORIN 3.5-10,000-1MG/ML-UNIT/ML  PRILOSEC 20MG      Medications listed below were obtained from: Patient/family and Analytic software- Peter NINA  (Not in a hospital admission)        Dara Fink  [id number]      Current Outpatient Medications on File Prior to Encounter   Medication Sig Dispense Refill Last Dose    amlodipine (NORVASC) 5 MG tablet Take 5 mg by mouth once daily.   9/5/2023    FOLIC ACID/MULTIVIT-MIN/LUTEIN (CENTRUM SILVER ORAL) Take 1 tablet by mouth once daily.   Past Week    glyBURIDE micronized (GLYNASE) 3 MG Tab Take 1.5 mg by mouth daily with breakfast.   9/5/2023    losartan (COZAAR) 100 MG tablet Take 100 mg by mouth once daily.   9/6/2023    lovastatin (MEVACOR) 20 MG tablet Take 20 mg by mouth every evening.   9/5/2023    metformin (GLUCOPHAGE) 850 MG tablet Take 850 mg by mouth 2 (two) times daily with meals.   9/6/2023                           .          "

## 2023-09-06 NOTE — PROGRESS NOTES
Pharmacist Renal Dose Adjustment Note    Shannan Shin is a 77 y.o. female being treated with the medication cefepime    Patient Data:    Vital Signs (Most Recent):  Temp: 98.5 °F (36.9 °C) (09/06/23 0415)  Pulse: 84 (09/06/23 0500)  Resp: (!) 25 (09/06/23 0500)  BP: (!) 90/54 (09/06/23 0500)  SpO2: 96 % (09/06/23 0500) Vital Signs (72h Range):  Temp:  [98.5 °F (36.9 °C)-102.9 °F (39.4 °C)]   Pulse:  []   Resp:  [18-27]   BP: ()/(51-60)   SpO2:  [95 %-100 %]      Recent Labs   Lab 09/06/23 0136   CREATININE 2.2*     Serum creatinine: 2.2 mg/dL (H) 09/06/23 0136  Estimated creatinine clearance: 12.6 mL/min (A)    Cefepime 2 g q8h will be changed to cefepime 1 g q24h for CrCl <30 mL/min and mild to moderate infection (indication: UTI)    Pharmacist's Name: Deanna Levi  Pharmacist's Extension: [phone]

## 2023-09-07 LAB
ALBUMIN SERPL BCP-MCNC: 2 G/DL (ref 3.5–5.2)
ALP SERPL-CCNC: 68 U/L (ref 55–135)
ALT SERPL W/O P-5'-P-CCNC: 12 U/L (ref 10–44)
ANION GAP SERPL CALC-SCNC: 10 MMOL/L (ref 8–16)
AST SERPL-CCNC: 34 U/L (ref 10–40)
BASOPHILS # BLD AUTO: 0.17 K/UL (ref 0–0.2)
BASOPHILS NFR BLD: 0.8 % (ref 0–1.9)
BILIRUB SERPL-MCNC: 0.3 MG/DL (ref 0.1–1)
BUN SERPL-MCNC: 28 MG/DL (ref 8–23)
CALCIUM SERPL-MCNC: 7.3 MG/DL (ref 8.7–10.5)
CHLORIDE SERPL-SCNC: 105 MMOL/L (ref 95–110)
CO2 SERPL-SCNC: 20 MMOL/L (ref 23–29)
CREAT SERPL-MCNC: 1.8 MG/DL (ref 0.5–1.4)
DIFFERENTIAL METHOD: ABNORMAL
EOSINOPHIL # BLD AUTO: 0 K/UL (ref 0–0.5)
EOSINOPHIL NFR BLD: 0.1 % (ref 0–8)
ERYTHROCYTE [DISTWIDTH] IN BLOOD BY AUTOMATED COUNT: 12.5 % (ref 11.5–14.5)
EST. GFR  (NO RACE VARIABLE): 29 ML/MIN/1.73 M^2
GLUCOSE SERPL-MCNC: 92 MG/DL (ref 70–110)
HCT VFR BLD AUTO: 28.5 % (ref 37–48.5)
HGB BLD-MCNC: 9.7 G/DL (ref 12–16)
IMM GRANULOCYTES # BLD AUTO: 0.11 K/UL (ref 0–0.04)
IMM GRANULOCYTES NFR BLD AUTO: 0.5 % (ref 0–0.5)
LYMPHOCYTES # BLD AUTO: 0.7 K/UL (ref 1–4.8)
LYMPHOCYTES NFR BLD: 3.2 % (ref 18–48)
MAGNESIUM SERPL-MCNC: 2 MG/DL (ref 1.6–2.6)
MCH RBC QN AUTO: 30.6 PG (ref 27–31)
MCHC RBC AUTO-ENTMCNC: 34 G/DL (ref 32–36)
MCV RBC AUTO: 90 FL (ref 82–98)
MONOCYTES # BLD AUTO: 0.6 K/UL (ref 0.3–1)
MONOCYTES NFR BLD: 3.1 % (ref 4–15)
NEUTROPHILS # BLD AUTO: 18.7 K/UL (ref 1.8–7.7)
NEUTROPHILS NFR BLD: 92.3 % (ref 38–73)
NRBC BLD-RTO: 0 /100 WBC
PHOSPHATE SERPL-MCNC: 2.7 MG/DL (ref 2.7–4.5)
PLATELET # BLD AUTO: 151 K/UL (ref 150–450)
PMV BLD AUTO: 9.8 FL (ref 9.2–12.9)
POCT GLUCOSE: 121 MG/DL (ref 70–110)
POCT GLUCOSE: 176 MG/DL (ref 70–110)
POTASSIUM SERPL-SCNC: 3.8 MMOL/L (ref 3.5–5.1)
PROCALCITONIN SERPL IA-MCNC: 24 NG/ML
PROT SERPL-MCNC: 5.2 G/DL (ref 6–8.4)
RBC # BLD AUTO: 3.17 M/UL (ref 4–5.4)
SODIUM SERPL-SCNC: 135 MMOL/L (ref 136–145)
WBC # BLD AUTO: 20.31 K/UL (ref 3.9–12.7)

## 2023-09-07 PROCEDURE — 84145 PROCALCITONIN (PCT): CPT | Performed by: NURSE PRACTITIONER

## 2023-09-07 PROCEDURE — 27000221 HC OXYGEN, UP TO 24 HOURS

## 2023-09-07 PROCEDURE — 63600175 PHARM REV CODE 636 W HCPCS: Performed by: INTERNAL MEDICINE

## 2023-09-07 PROCEDURE — 84100 ASSAY OF PHOSPHORUS: CPT | Performed by: NURSE PRACTITIONER

## 2023-09-07 PROCEDURE — 25000003 PHARM REV CODE 250: Performed by: NURSE PRACTITIONER

## 2023-09-07 PROCEDURE — 94761 N-INVAS EAR/PLS OXIMETRY MLT: CPT

## 2023-09-07 PROCEDURE — 99900035 HC TECH TIME PER 15 MIN (STAT)

## 2023-09-07 PROCEDURE — 80053 COMPREHEN METABOLIC PANEL: CPT | Performed by: NURSE PRACTITIONER

## 2023-09-07 PROCEDURE — 85025 COMPLETE CBC W/AUTO DIFF WBC: CPT | Performed by: NURSE PRACTITIONER

## 2023-09-07 PROCEDURE — 11000001 HC ACUTE MED/SURG PRIVATE ROOM

## 2023-09-07 PROCEDURE — 83735 ASSAY OF MAGNESIUM: CPT | Performed by: NURSE PRACTITIONER

## 2023-09-07 PROCEDURE — 63600175 PHARM REV CODE 636 W HCPCS: Performed by: NURSE PRACTITIONER

## 2023-09-07 RX ADMIN — CEFTRIAXONE SODIUM 2 G: 2 INJECTION, POWDER, FOR SOLUTION INTRAMUSCULAR; INTRAVENOUS at 09:09

## 2023-09-07 RX ADMIN — ACETAMINOPHEN 650 MG: 325 TABLET ORAL at 09:09

## 2023-09-07 RX ADMIN — ENOXAPARIN SODIUM 30 MG: 30 INJECTION SUBCUTANEOUS at 05:09

## 2023-09-07 RX ADMIN — CEFEPIME 1 G: 1 INJECTION, POWDER, FOR SOLUTION INTRAMUSCULAR; INTRAVENOUS at 02:09

## 2023-09-07 RX ADMIN — SODIUM BICARBONATE: 84 INJECTION, SOLUTION INTRAVENOUS at 11:09

## 2023-09-07 NOTE — PLAN OF CARE
Patient remained free of any injury throughout shift. VSS. No complaints of any pain. IV antibiotics as needed. Monitoring cbg and giving coverage as needed. Call light in reach and side rails x2. Will continue with plan of care.     Problem: Adult Inpatient Plan of Care  Goal: Absence of Hospital-Acquired Illness or Injury  Outcome: Ongoing, Progressing     Problem: Adult Inpatient Plan of Care  Goal: Optimal Comfort and Wellbeing  Outcome: Ongoing, Progressing     Problem: Infection  Goal: Absence of Infection Signs and Symptoms  Outcome: Ongoing, Progressing     Problem: Glycemic Control Impaired (Sepsis/Septic Shock)  Goal: Blood Glucose Level Within Desired Range  Outcome: Ongoing, Progressing     Problem: Infection Progression (Sepsis/Septic Shock)  Goal: Absence of Infection Signs and Symptoms  Outcome: Ongoing, Progressing

## 2023-09-07 NOTE — PLAN OF CARE
09/07/23 1428   Post-Acute Status   Post-Acute Authorization Placement   Post-Acute Placement Status Patient List Provided   Discharge Delays None known at this time   Discharge Plan   Discharge Plan A Skilled Nursing Facility     Sw provided a list of accepting SNF's to pt and pt's dtrAlena. Sw encouraged pt and family to review the list and notify Sw once they have made a selection; they agreed.

## 2023-09-07 NOTE — ASSESSMENT & PLAN NOTE
Patient with acute kidney injury/acute renal failure likely due to pre-renal azotemia due to dehydration TRENT is currently worsening. Baseline creatinine unknown - Labs reviewed- Renal function/electrolytes with Estimated Creatinine Clearance: 15.4 mL/min (A) (based on SCr of 1.8 mg/dL (H)). according to latest data. Monitor urine output and serial BMP and adjust therapy as needed. Avoid nephrotoxins and renally dose meds for GFR listed above.    Unknown baseline   Renal ultrasound  IVfs  Monitor

## 2023-09-07 NOTE — ASSESSMENT & PLAN NOTE
No hematochezia, melena, hematemesis  Iron studies- consistent with anemia of chronic disease  S/P 1U PRBC on 9/6    --Daily CBC  --Transfuse with 1 unit PRBC for Hgb <7.0 or <8.0 if symptomatic

## 2023-09-07 NOTE — ASSESSMENT & PLAN NOTE
This patient does have evidence of infective focus  My overall impression is sepsis.  Source: Urinary Tract  Antibiotics given-   Antibiotics (72h ago, onward)    Start     Stop Route Frequency Ordered    09/07/23 0915  cefTRIAXone (ROCEPHIN) 2 g in dextrose 5 % in water (D5W) 100 mL IVPB (MB+)         -- IV Every 24 hours (non-standard times) 09/07/23 0800        Latest lactate reviewed-  Recent Labs   Lab 09/06/23  0136 09/06/23  0509   LACTATE 1.4 0.7     Organ dysfunction indicated by Acute kidney injury    Fluid challenge Actual Body weight- Patient will receive 30ml/kg actual body weight to calculate fluid bolus for treatment of septic shock.     Post- resuscitation assessment Yes Perfusion exam was performed within 6 hours of septic shock presentation after bolus shows Adequate tissue perfusion assessed by non-invasive monitoring       Will Not start Pressors- Levophed for MAP of 65  Source control achieved by: IV Cefepime

## 2023-09-07 NOTE — PROGRESS NOTES
[facility] Medicine  Progress Note    Patient Name: Shannan Shin  MRN: [id number]  Patient Class: IP- Inpatient   Admission Date: 9/6/2023  Length of Stay: 1 days  Attending Physician: Alexis Howe MD  Primary Care Provider: Danny Jackson MD        Subjective:     Principal Problem:Sepsis        HPI:  The patient is a 76 yo Macedonian female with DM, HTN, and HLD who presents to ED with Fever, generalized weakness, and fatigue x 2 days. The pt's son reported the pt also complained of dysuria for approx one week. She was seen at  2 days ago for her symptoms and was diagnosed with a UTI discharged on Cipro and Pyridium without improvement.     In the ED, Temp 102.9F, mild tachycardia and tachypnea, BP low normal. Labs showed WBC 16 with 9% bands, Hgb 9.1 (unsure of baseline), Cr 2.2 (unsure of baseline), Na 131, Bicarb 16, LA normal, procalcitonin 27. +UTI. COVID/FLU negative. CXR- clear.       Overview/Hospital Course:  77 year old female, under the care of Hospital Medicine for management of UTI/Sepsis. BC: + gram neg rods, changed IV antibiotic to Rocephin. Afebrile overnight. Improved appetite. PT/OT to eval. Hgb improved to 9.7, received 1U PRBC on admission. Iron studies consistent with anemia of chronic disease. Vitals stable.       Interval History: BC: +gram - rods, changed Abx to Rocephin per guidance of pharmacy    Review of Systems   Constitutional:  Positive for activity change, appetite change and fatigue. Negative for chills, diaphoresis, fever and unexpected weight change.   HENT:  Negative for congestion, nosebleeds, sinus pressure and sore throat.    Eyes:  Negative for pain, discharge and visual disturbance.   Respiratory:  Negative for cough, chest tightness, shortness of breath, wheezing and stridor.    Cardiovascular:  Negative for chest pain, palpitations and leg swelling.   Gastrointestinal:  Negative for abdominal distention, abdominal pain, blood in stool, constipation,  diarrhea, nausea and vomiting.   Endocrine: Negative for cold intolerance and heat intolerance.   Genitourinary:  Positive for dysuria. Negative for difficulty urinating, flank pain, frequency and urgency.   Musculoskeletal:  Negative for arthralgias, back pain, joint swelling, myalgias, neck pain and neck stiffness.   Skin:  Negative for rash and wound.   Allergic/Immunologic: Negative for food allergies and immunocompromised state.   Neurological:  Positive for weakness (generalized). Negative for dizziness, seizures, syncope, facial asymmetry, speech difficulty, light-headedness, numbness and headaches.   Hematological:  Negative for adenopathy.   Psychiatric/Behavioral:  Negative for agitation, confusion and hallucinations.      Objective:     Vital Signs (Most Recent):  Temp: 98.2 °F (36.8 °C) (09/07/23 1628)  Pulse: 85 (09/07/23 1628)  Resp: 18 (09/07/23 1628)  BP: (!) 123/58 (09/07/23 1628)  SpO2: 95 % (09/07/23 1628) Vital Signs (24h Range):  Temp:  [97.9 °F (36.6 °C)-99.7 °F (37.6 °C)] 98.2 °F (36.8 °C)  Pulse:  [76-87] 85  Resp:  [16-18] 18  SpO2:  [85 %-97 %] 95 %  BP: (110-144)/(55-60) 123/58     Weight: 36.6 kg (80 lb 9.6 oz)  Body mass index is 15.74 kg/m².    Intake/Output Summary (Last 24 hours) at 9/7/2023 1758  Last data filed at 9/7/2023 1703  Gross per 24 hour   Intake 2592.88 ml   Output 1575 ml   Net 1017.88 ml         Physical Exam  Vitals and nursing note reviewed.   Constitutional:       General: She is not in acute distress.     Appearance: She is underweight. She is not diaphoretic.      Comments: Thin, small framed, frail appearing    HENT:      Head: Normocephalic and atraumatic.      Nose: Nose normal.   Eyes:      General: No scleral icterus.     Conjunctiva/sclera: Conjunctivae normal.   Neck:      Trachea: No tracheal deviation.   Cardiovascular:      Rate and Rhythm: Normal rate and regular rhythm.      Heart sounds: Normal heart sounds. No murmur heard.     No friction rub. No  gallop.   Pulmonary:      Effort: Pulmonary effort is normal. No respiratory distress.      Breath sounds: Normal breath sounds. No stridor. No wheezing or rales.   Chest:      Chest wall: No tenderness.   Abdominal:      General: Bowel sounds are normal. There is no distension.      Palpations: Abdomen is soft. There is no mass.      Tenderness: There is no abdominal tenderness. There is no guarding or rebound.   Musculoskeletal:         General: No tenderness or deformity. Normal range of motion.      Cervical back: Normal range of motion and neck supple.   Skin:     General: Skin is warm and dry.      Coloration: Skin is not pale.      Findings: No erythema or rash.   Neurological:      Mental Status: She is alert.      Cranial Nerves: No cranial nerve deficit.      Motor: No abnormal muscle tone.      Coordination: Coordination normal.      Comments: Awake and alert   Psychiatric:         Behavior: Behavior normal.         Thought Content: Thought content normal.             Significant Labs: All pertinent labs within the past 24 hours have been reviewed.  CBC:   Recent Labs   Lab 09/06/23  0245 09/06/23  0509 09/07/23  0655   WBC 16.23* 8.62 20.31*   HGB 9.1* 7.7* 9.7*   HCT 27.4* 24.3* 28.5*    160 151     CMP:   Recent Labs   Lab 09/06/23  0136 09/06/23  0509 09/07/23  0655   * 132* 135*   K 4.7 4.0 3.8    107 105   CO2 16* 13* 20*   * 113* 92   BUN 25* 26* 28*   CREATININE 2.2* 1.9* 1.8*   CALCIUM 9.2 8.0* 7.3*   PROT 7.2 5.9* 5.2*   ALBUMIN 3.3* 2.7* 2.0*   BILITOT 0.5 0.4 0.3   ALKPHOS 60 51* 68   AST 24 23 34   ALT 13 12 12   ANIONGAP 14 12 10       Significant Imaging: I have reviewed all pertinent imaging results/findings within the past 24 hours.      Assessment/Plan:      * Sepsis  This patient does have evidence of infective focus  My overall impression is sepsis.  Source: Urinary Tract  Antibiotics given-   Antibiotics (72h ago, onward)    Start     Stop Route Frequency  Ordered    09/07/23 0915  cefTRIAXone (ROCEPHIN) 2 g in dextrose 5 % in water (D5W) 100 mL IVPB (MB+)         -- IV Every 24 hours (non-standard times) 09/07/23 0800        Latest lactate reviewed-  Recent Labs   Lab 09/06/23  0136 09/06/23  0509   LACTATE 1.4 0.7     Organ dysfunction indicated by Acute kidney injury    Fluid challenge Actual Body weight- Patient will receive 30ml/kg actual body weight to calculate fluid bolus for treatment of septic shock.     Post- resuscitation assessment Yes Perfusion exam was performed within 6 hours of septic shock presentation after bolus shows Adequate tissue perfusion assessed by non-invasive monitoring       Will Not start Pressors- Levophed for MAP of 65  Source control achieved by: IV Cefepime    Hypertension  BP low normal   Hold antihypertensives       Diabetes  Patient's FSGs are controlled on current medication regimen.  Last A1c reviewed-   Lab Results   Component Value Date    HGBA1C 5.7 (H) 09/06/2023     Most recent fingerstick glucose reviewed-   Recent Labs   Lab 09/06/23  2145 09/07/23  1152   POCTGLUCOSE 150* 176*     Current correctional scale  Low  Maintain anti-hyperglycemic dose as follows-   Antihyperglycemics (From admission, onward)    Start     Stop Route Frequency Ordered    09/06/23 0531  insulin aspart U-100 pen 0-5 Units         -- SubQ Before meals & nightly PRN 09/06/23 0433        Hold Oral hypoglycemics while patient is in the hospital.    Check Hgb A1c  Monitor     Metabolic acidosis  likely 2/2 dehydration and TRENT  IVFs for now   Monitor       Hyponatremia  Patient has hyponatremia which is uncontrolled,We will aim to correct the sodium by 4-6mEq in 24 hours. We will monitor sodium Daily. The hyponatremia is due to Dehydration/hypovolemia.  We will treat the hyponatremia with IV fluids as follows: NS iVfs. The patient's sodium results have been reviewed and are listed below.  Recent Labs   Lab 09/07/23  0655   *       ARF (acute renal  failure)  Patient with acute kidney injury/acute renal failure likely due to pre-renal azotemia due to dehydration TRENT is currently worsening. Baseline creatinine unknown - Labs reviewed- Renal function/electrolytes with Estimated Creatinine Clearance: 15.4 mL/min (A) (based on SCr of 1.8 mg/dL (H)). according to latest data. Monitor urine output and serial BMP and adjust therapy as needed. Avoid nephrotoxins and renally dose meds for GFR listed above.    Unknown baseline   Renal ultrasound  IVfs  Monitor     Acute cystitis without hematuria  IV Cefepime changed to Rocephin  Blood and urine culture   BC: + gram neg rods  UC: pending      Normocytic anemia  No hematochezia, melena, hematemesis  Iron studies- consistent with anemia of chronic disease  S/P 1U PRBC on 9/6    --Daily CBC  --Transfuse with 1 unit PRBC for Hgb <7.0 or <8.0 if symptomatic         VTE Risk Mitigation (From admission, onward)         Ordered     enoxaparin injection 30 mg  Daily         09/06/23 0439     IP VTE HIGH RISK PATIENT  Once         09/06/23 0433     Place sequential compression device  Until discontinued         09/06/23 0433                Discharge Planning   SCARLETT:      Code Status: Full Code   Is the patient medically ready for discharge?:     Reason for patient still in hospital (select all that apply): Patient trending condition  Discharge Plan A: Skilled Nursing Facility   Discharge Delays: None known at this time              Susie Espinoza NP  Department of Hospital Medicine   [facility] - Med Surg

## 2023-09-07 NOTE — ASSESSMENT & PLAN NOTE
Patient's FSGs are controlled on current medication regimen.  Last A1c reviewed-   Lab Results   Component Value Date    HGBA1C 5.7 (H) 09/06/2023     Most recent fingerstick glucose reviewed-   Recent Labs   Lab 09/06/23  2145 09/07/23  1152   POCTGLUCOSE 150* 176*     Current correctional scale  Low  Maintain anti-hyperglycemic dose as follows-   Antihyperglycemics (From admission, onward)    Start     Stop Route Frequency Ordered    09/06/23 0531  insulin aspart U-100 pen 0-5 Units         -- SubQ Before meals & nightly PRN 09/06/23 0433        Hold Oral hypoglycemics while patient is in the hospital.    Check Hgb A1c  Monitor

## 2023-09-07 NOTE — PLAN OF CARE
[facility] - Med [facility]  Initial Discharge Assessment       Primary Care Provider: Danny Jackson MD    Admission Diagnosis: Weakness [R53.1]  Sepsis due to urinary tract infection [A41.9, N39.0]  Chest pain [R07.9]    Admission Date: 9/6/2023  Expected Discharge Date:     Transition of Care Barriers: Other (see comments) (language barrier)    Payor: MEDICARE / Plan: MEDICARE PART A & B / Product Type: Government /     Extended Emergency Contact Information  Primary Emergency Contact: Oleg Mcnulty  Address: [address] [place] [address]           [place] [address] [facility]  Home Phone: [phone]  Relation: Son    Discharge Plan A: Skilled Nursing Facility  Discharge Plan B: Home Health      [id number]  [place] - [address]  [address]  [place] [address]  Phone: [phone] Fax: [phone]      Initial Assessment (most recent)       Adult Discharge Assessment - 09/07/23 1140          Discharge Assessment    Assessment Type Discharge Planning Assessment     Confirmed/corrected address, phone number and insurance Yes     Confirmed Demographics Correct on Facesheet     Source of Information family     Communicated SCARLETT with patient/caregiver Date not available/Unable to determine     Reason For Admission sepsis     People in Home child(divya), adult;other relative(s)     Do you expect to return to your current living situation? No     Do you have help at home or someone to help you manage your care at home? Yes     Who are your caregiver(s) and their phone number(s)? Family     Home Layout Able to live on 1st floor     Equipment Currently Used at Home none     Readmission within 30 days? No     Patient currently being followed by outpatient case management? No     Do you currently have service(s) that help you manage your care at home? No     Do you take prescription medications? Yes     Do you have prescription coverage? Yes     Coverage Medicare     Do you have any problems  affording any of your prescribed medications? No     Is the patient taking medications as prescribed? yes     Who is going to help you get home at discharge? Family     How do you get to doctors appointments? family or friend will provide     Are you on dialysis? No     Do you take coumadin? No     DME Needed Upon Discharge  none     Discharge Plan discussed with: Patient;Adult children     Transition of Care Barriers Other (see comments)   language barrier    Discharge Plan A Skilled Nursing Facility     Discharge Plan B Home Health                      Sw met with patient to complete assessment and to discuss d/c planning needs.     Sw used  line, Dagoberto Menendez  #: [phone], to discuss d/c plans with pt and dtr, [facility]. Pt and pt's dtr are agreeable to SNF placement. Makenna explained the process and stated she will provide a list of accepting SNF's once available for pt and family to review; they are agreeable.

## 2023-09-07 NOTE — SUBJECTIVE & OBJECTIVE
Interval History: BC: +gram - rods, changed Abx to Rocephin per guidance of pharmacy    Review of Systems   Constitutional:  Positive for activity change, appetite change and fatigue. Negative for chills, diaphoresis, fever and unexpected weight change.   HENT:  Negative for congestion, nosebleeds, sinus pressure and sore throat.    Eyes:  Negative for pain, discharge and visual disturbance.   Respiratory:  Negative for cough, chest tightness, shortness of breath, wheezing and stridor.    Cardiovascular:  Negative for chest pain, palpitations and leg swelling.   Gastrointestinal:  Negative for abdominal distention, abdominal pain, blood in stool, constipation, diarrhea, nausea and vomiting.   Endocrine: Negative for cold intolerance and heat intolerance.   Genitourinary:  Positive for dysuria. Negative for difficulty urinating, flank pain, frequency and urgency.   Musculoskeletal:  Negative for arthralgias, back pain, joint swelling, myalgias, neck pain and neck stiffness.   Skin:  Negative for rash and wound.   Allergic/Immunologic: Negative for food allergies and immunocompromised state.   Neurological:  Positive for weakness (generalized). Negative for dizziness, seizures, syncope, facial asymmetry, speech difficulty, light-headedness, numbness and headaches.   Hematological:  Negative for adenopathy.   Psychiatric/Behavioral:  Negative for agitation, confusion and hallucinations.      Objective:     Vital Signs (Most Recent):  Temp: 98.2 °F (36.8 °C) (09/07/23 1628)  Pulse: 85 (09/07/23 1628)  Resp: 18 (09/07/23 1628)  BP: (!) 123/58 (09/07/23 1628)  SpO2: 95 % (09/07/23 1628) Vital Signs (24h Range):  Temp:  [97.9 °F (36.6 °C)-99.7 °F (37.6 °C)] 98.2 °F (36.8 °C)  Pulse:  [76-87] 85  Resp:  [16-18] 18  SpO2:  [85 %-97 %] 95 %  BP: (110-144)/(55-60) 123/58     Weight: 36.6 kg (80 lb 9.6 oz)  Body mass index is 15.74 kg/m².    Intake/Output Summary (Last 24 hours) at 9/7/2023 1786  Last data filed at 9/7/2023  1703  Gross per 24 hour   Intake 2592.88 ml   Output 1575 ml   Net 1017.88 ml         Physical Exam  Vitals and nursing note reviewed.   Constitutional:       General: She is not in acute distress.     Appearance: She is underweight. She is not diaphoretic.      Comments: Thin, small framed, frail appearing    HENT:      Head: Normocephalic and atraumatic.      Nose: Nose normal.   Eyes:      General: No scleral icterus.     Conjunctiva/sclera: Conjunctivae normal.   Neck:      Trachea: No tracheal deviation.   Cardiovascular:      Rate and Rhythm: Normal rate and regular rhythm.      Heart sounds: Normal heart sounds. No murmur heard.     No friction rub. No gallop.   Pulmonary:      Effort: Pulmonary effort is normal. No respiratory distress.      Breath sounds: Normal breath sounds. No stridor. No wheezing or rales.   Chest:      Chest wall: No tenderness.   Abdominal:      General: Bowel sounds are normal. There is no distension.      Palpations: Abdomen is soft. There is no mass.      Tenderness: There is no abdominal tenderness. There is no guarding or rebound.   Musculoskeletal:         General: No tenderness or deformity. Normal range of motion.      Cervical back: Normal range of motion and neck supple.   Skin:     General: Skin is warm and dry.      Coloration: Skin is not pale.      Findings: No erythema or rash.   Neurological:      Mental Status: She is alert.      Cranial Nerves: No cranial nerve deficit.      Motor: No abnormal muscle tone.      Coordination: Coordination normal.      Comments: Awake and alert   Psychiatric:         Behavior: Behavior normal.         Thought Content: Thought content normal.             Significant Labs: All pertinent labs within the past 24 hours have been reviewed.  CBC:   Recent Labs   Lab 09/06/23  0245 09/06/23  0509 09/07/23  0655   WBC 16.23* 8.62 20.31*   HGB 9.1* 7.7* 9.7*   HCT 27.4* 24.3* 28.5*    160 151     CMP:   Recent Labs   Lab 09/06/23  0136  09/06/23  0509 09/07/23  0655   * 132* 135*   K 4.7 4.0 3.8    107 105   CO2 16* 13* 20*   * 113* 92   BUN 25* 26* 28*   CREATININE 2.2* 1.9* 1.8*   CALCIUM 9.2 8.0* 7.3*   PROT 7.2 5.9* 5.2*   ALBUMIN 3.3* 2.7* 2.0*   BILITOT 0.5 0.4 0.3   ALKPHOS 60 51* 68   AST 24 23 34   ALT 13 12 12   ANIONGAP 14 12 10       Significant Imaging: I have reviewed all pertinent imaging results/findings within the past 24 hours.

## 2023-09-07 NOTE — ASSESSMENT & PLAN NOTE
Patient has hyponatremia which is uncontrolled,We will aim to correct the sodium by 4-6mEq in 24 hours. We will monitor sodium Daily. The hyponatremia is due to Dehydration/hypovolemia.  We will treat the hyponatremia with IV fluids as follows: NS iVfs. The patient's sodium results have been reviewed and are listed below.  Recent Labs   Lab 09/07/23  0655   *

## 2023-09-07 NOTE — HOSPITAL COURSE
77 year old female, under the care of Hospital Medicine for management of UTI/Sepsis. BC: + gram neg rods, changed IV antibiotic to Rocephin. Afebrile overnight. Improved appetite. PT/OT to eval. Hgb improved to 9.7, received 1U PRBC on admission. Iron studies consistent with anemia of chronic disease. Vitals stable.     Blood and urine cultures +E. Coli  Patient's condition improved, no further complaints of dysuria   hotline used to communicate with patient, grand-daughter at bedside who unable to translate.  Also discussed with patient's son over phone while in room  Patient wishes to return home rather than SNF  Plan to arrange home health with PT/OT  Benefit from rolling walker and bedside commode, DME ordered  Plan to complete course of PO antibiotics on discharge  Advised to f/u with PCP in 1-2 weeks  Stable for hospital discharge

## 2023-09-08 LAB
ALBUMIN SERPL BCP-MCNC: 1.8 G/DL (ref 3.5–5.2)
ALP SERPL-CCNC: 77 U/L (ref 55–135)
ALT SERPL W/O P-5'-P-CCNC: 12 U/L (ref 10–44)
ANION GAP SERPL CALC-SCNC: 8 MMOL/L (ref 8–16)
ANISOCYTOSIS BLD QL SMEAR: SLIGHT
AST SERPL-CCNC: 25 U/L (ref 10–40)
BACTERIA BLD CULT: ABNORMAL
BACTERIA UR CULT: ABNORMAL
BASOPHILS # BLD AUTO: 0.07 K/UL (ref 0–0.2)
BASOPHILS NFR BLD: 0.4 % (ref 0–1.9)
BILIRUB SERPL-MCNC: 0.2 MG/DL (ref 0.1–1)
BUN SERPL-MCNC: 20 MG/DL (ref 8–23)
CALCIUM SERPL-MCNC: 7.3 MG/DL (ref 8.7–10.5)
CHLORIDE SERPL-SCNC: 105 MMOL/L (ref 95–110)
CO2 SERPL-SCNC: 25 MMOL/L (ref 23–29)
CREAT SERPL-MCNC: 1.5 MG/DL (ref 0.5–1.4)
DIFFERENTIAL METHOD: ABNORMAL
EOSINOPHIL # BLD AUTO: 0.1 K/UL (ref 0–0.5)
EOSINOPHIL NFR BLD: 0.8 % (ref 0–8)
ERYTHROCYTE [DISTWIDTH] IN BLOOD BY AUTOMATED COUNT: 12.7 % (ref 11.5–14.5)
EST. GFR  (NO RACE VARIABLE): 36 ML/MIN/1.73 M^2
GLUCOSE SERPL-MCNC: 110 MG/DL (ref 70–110)
HCT VFR BLD AUTO: 28.2 % (ref 37–48.5)
HGB BLD-MCNC: 9.7 G/DL (ref 12–16)
IMM GRANULOCYTES # BLD AUTO: 0.06 K/UL (ref 0–0.04)
IMM GRANULOCYTES NFR BLD AUTO: 0.4 % (ref 0–0.5)
LYMPHOCYTES # BLD AUTO: 0.9 K/UL (ref 1–4.8)
LYMPHOCYTES NFR BLD: 5.5 % (ref 18–48)
MAGNESIUM SERPL-MCNC: 2 MG/DL (ref 1.6–2.6)
MCH RBC QN AUTO: 31 PG (ref 27–31)
MCHC RBC AUTO-ENTMCNC: 34.4 G/DL (ref 32–36)
MCV RBC AUTO: 90 FL (ref 82–98)
MONOCYTES # BLD AUTO: 0.8 K/UL (ref 0.3–1)
MONOCYTES NFR BLD: 4.9 % (ref 4–15)
NEUTROPHILS # BLD AUTO: 13.8 K/UL (ref 1.8–7.7)
NEUTROPHILS NFR BLD: 88 % (ref 38–73)
NRBC BLD-RTO: 0 /100 WBC
PHOSPHATE SERPL-MCNC: 2.5 MG/DL (ref 2.7–4.5)
PLATELET # BLD AUTO: 159 K/UL (ref 150–450)
PMV BLD AUTO: 10.1 FL (ref 9.2–12.9)
POCT GLUCOSE: 118 MG/DL (ref 70–110)
POCT GLUCOSE: 128 MG/DL (ref 70–110)
POCT GLUCOSE: 169 MG/DL (ref 70–110)
POCT GLUCOSE: 95 MG/DL (ref 70–110)
POTASSIUM SERPL-SCNC: 3.7 MMOL/L (ref 3.5–5.1)
PROT SERPL-MCNC: 5 G/DL (ref 6–8.4)
RBC # BLD AUTO: 3.13 M/UL (ref 4–5.4)
SODIUM SERPL-SCNC: 138 MMOL/L (ref 136–145)
WBC # BLD AUTO: 15.72 K/UL (ref 3.9–12.7)

## 2023-09-08 PROCEDURE — 36415 COLL VENOUS BLD VENIPUNCTURE: CPT | Performed by: NURSE PRACTITIONER

## 2023-09-08 PROCEDURE — 25000003 PHARM REV CODE 250: Performed by: NURSE PRACTITIONER

## 2023-09-08 PROCEDURE — 94761 N-INVAS EAR/PLS OXIMETRY MLT: CPT

## 2023-09-08 PROCEDURE — 97530 THERAPEUTIC ACTIVITIES: CPT | Mod: CQ

## 2023-09-08 PROCEDURE — 11000001 HC ACUTE MED/SURG PRIVATE ROOM

## 2023-09-08 PROCEDURE — 27000221 HC OXYGEN, UP TO 24 HOURS

## 2023-09-08 PROCEDURE — 99900035 HC TECH TIME PER 15 MIN (STAT)

## 2023-09-08 PROCEDURE — 63600175 PHARM REV CODE 636 W HCPCS: Performed by: NURSE PRACTITIONER

## 2023-09-08 PROCEDURE — 83735 ASSAY OF MAGNESIUM: CPT | Performed by: NURSE PRACTITIONER

## 2023-09-08 PROCEDURE — 85025 COMPLETE CBC W/AUTO DIFF WBC: CPT | Performed by: NURSE PRACTITIONER

## 2023-09-08 PROCEDURE — 84100 ASSAY OF PHOSPHORUS: CPT | Performed by: NURSE PRACTITIONER

## 2023-09-08 PROCEDURE — 80053 COMPREHEN METABOLIC PANEL: CPT | Performed by: NURSE PRACTITIONER

## 2023-09-08 PROCEDURE — 63600175 PHARM REV CODE 636 W HCPCS: Performed by: INTERNAL MEDICINE

## 2023-09-08 RX ADMIN — CEFTRIAXONE SODIUM 2 G: 2 INJECTION, POWDER, FOR SOLUTION INTRAMUSCULAR; INTRAVENOUS at 09:09

## 2023-09-08 RX ADMIN — ENOXAPARIN SODIUM 30 MG: 30 INJECTION SUBCUTANEOUS at 04:09

## 2023-09-08 RX ADMIN — SODIUM BICARBONATE: 84 INJECTION, SOLUTION INTRAVENOUS at 04:09

## 2023-09-08 NOTE — PT/OT/SLP PROGRESS
Physical Therapy  Treatment    Shannan Shin   MRN: [id number]   Admitting Diagnosis: Sepsis    PT Received On: 09/08/23  PT Start Time: 1045     PT Stop Time: 1110    PT Total Time (min): 25 min       Billable Minutes:  Therapeutic Activity 25    Treatment Type: Treatment  PT/PTA: PTA     Number of PTA visits since last PT visit: 1       General Precautions: Standard, fall  Orthopedic Precautions: N/A  Braces: N/A  Respiratory Status: Nasal cannula, flow 2 L/min         Subjective:  Communicated with charge nurse, Olivia, and completed Epic chart review prior to session.  Patient agreed to PT session.     ANDERS VIRTUAL INTERPRETOR USED THROUGHOUT SESSION TO MAXIMIZE EFFECTIVE COMMUNICATION    Pain/Comfort  Pain Rating 1: 0/10  Pain Rating Post-Intervention 1: 0/10    Objective:   Patient found with: telemetry, PureWick, peripheral IV    Supine > sit EOB: Min A     Forward scoot towards EOB: SBA    Seated EOB x 10 min total focusing on increased tolerance to upright position, core stability, trunk control and CV endurance.   Maintained self supported sitting balance with SBA  Maintained unsupported sitting balance with  SBA    Completed x10 reps AROM TE to BLE: LAQ, Hip Flex, AP   Intermittent cues given as needed to maintain correct form during repetitions    STS from EOB w/ HHAx2: Min A     Stand pivot T/F to chair w/ HHAx2: Min A     Educated patient on importance of increased tolerance to upright position and direct impact on CV endurance and strength. Patient encouraged to sit up in chair/ EOB, for a minimum of 2 consecutive hours, 3x per day. Encouraged patient to perform AROM TE to BLE throughout the day within all available planes of motion. Re enforced importance of utilizing call light to meet needs in room and not attempt to get up without staff assistance. Patient verbalized understanding and agreed to comply.       AM-PAC 6 CLICK MOBILITY  How much help from another person does this patient currently  need?   1 = Unable, Total/Dependent Assistance  2 = A lot, Maximum/Moderate Assistance  3 = A little, Minimum/Contact Guard/Supervision  4 = None, Modified [place]/Independent    Turning over in bed (including adjusting bedclothes, sheets and blankets)?: 3  Sitting down on and standing up from a chair with arms (e.g., wheelchair, bedside commode, etc.): 3  Moving from lying on back to sitting on the side of the bed?: 3  Moving to and from a bed to a chair (including a wheelchair)?: 3  Need to walk in hospital room?: 2  Climbing 3-5 steps with a railing?: 1 (NT)  Basic Mobility Total Score: 15    AM-PAC Raw Score CMS G-Code Modifier Level of Impairment Assistance   6 % Total / Unable   7 - 9 CM 80 - 100% Maximal Assist   10 - 14 CL 60 - 80% Moderate Assist   15 - 19 CK 40 - 60% Moderate Assist   20 - 22 CJ 20 - 40% Minimal Assist   23 CI 1-20% SBA / CGA   24 CH 0% Independent/ Mod I     Patient left up in chair with call button in reach and chair alarm on.    Assessment:  Shannan Shin is a 77 y.o. female with a medical diagnosis of Sepsis and presents with overall decline in functional mobility. Patient would continue to benefit from skilled PT to address functional limitations listed below in order to return to PLOF/decrease caregiver burden. Patient demonstrated a significant improvement in overall functional mobility and ability to participate in PT session. Reduced level of physical assistance required. Patient fatigues quickly.     Rehab identified problem list/impairments: weakness, impaired endurance, impaired self care skills, impaired functional mobility, gait instability, impaired balance, decreased safety awareness, decreased lower extremity function, decreased upper extremity function, decreased coordination, impaired cardiopulmonary response to activity    Rehab potential is good.    Activity tolerance: Fair    Discharge recommendations: nursing facility, skilled      Barriers to discharge:       Equipment recommendations: to be determined by next level of care     GOALS:   Multidisciplinary Problems       Physical Therapy Goals          Problem: Physical Therapy    Goal Priority Disciplines Outcome Goal Variances Interventions   Physical Therapy Goal     PT, PT/OT      Description: LTG'S TO BE MET IN 14 DAYS (9-20-23)  PT WILL REQUIRE MODA FOR BED MOBILITY  PT WILL REQUIRE [facility] FOR BED<>CHAIR TF'S  PT WILL  FEET WITH OR WITHOUT RW AND MODA  PT WILL INC AMPAC SCORE BY 2 POINTS TO PROGRESS GROSS FUNC MOBILITY                         PLAN:    Patient to be seen 3 x/week to address the above listed problems via gait training, therapeutic activities, therapeutic exercises  Plan of Care expires: 09/20/23  Plan of Care reviewed with: patient         09/08/2023

## 2023-09-08 NOTE — SUBJECTIVE & OBJECTIVE
Interval History: NAEON, family present, translating; patient reports dysuria, improving since admission, no new issues. SNF placement pending.    Review of Systems   All other systems reviewed and are negative.    Objective:     Vital Signs (Most Recent):  Temp: 97.2 °F (36.2 °C) (09/08/23 1605)  Pulse: 76 (09/08/23 1605)  Resp: 18 (09/08/23 1146)  BP: 130/60 (09/08/23 1605)  SpO2: 98 % (09/08/23 1146) Vital Signs (24h Range):  Temp:  [97.2 °F (36.2 °C)-101 °F (38.3 °C)] 97.2 °F (36.2 °C)  Pulse:  [74-89] 76  Resp:  [16-18] 18  SpO2:  [91 %-98 %] 98 %  BP: (111-146)/(56-65) 130/60     Weight: 36.6 kg (80 lb 9.6 oz)  Body mass index is 15.74 kg/m².    Intake/Output Summary (Last 24 hours) at 9/8/2023 1713  Last data filed at 9/8/2023 1511  Gross per 24 hour   Intake 1492.47 ml   Output 2600 ml   Net -1107.53 ml         Physical Exam  Vitals and nursing note reviewed.   Constitutional:       General: She is not in acute distress.     Appearance: Normal appearance. She is normal weight.   Cardiovascular:      Rate and Rhythm: Normal rate and regular rhythm.      Heart sounds: No murmur heard.  Pulmonary:      Effort: Pulmonary effort is normal. No respiratory distress.      Breath sounds: No wheezing.   Neurological:      General: No focal deficit present.      Mental Status: She is alert and oriented to person, place, and time.   Psychiatric:         Mood and Affect: Mood normal.         Behavior: Behavior normal.             Significant Labs: All pertinent labs within the past 24 hours have been reviewed.  Recent Lab Results  (Last 5 results in the past 24 hours)        09/08/23  1610   09/08/23  1130   09/08/23  0540   09/08/23  0435   09/07/23  1727        Albumin       1.8         Alkaline Phosphatase       77         ALT       12         Anion Gap       8         Aniso       Slight         AST       25         Baso #       0.07         Basophil %       0.4         BILIRUBIN TOTAL       0.2  Comment: For infants  and newborns, interpretation of results should be based  on gestational age, weight and in agreement with clinical  observations.    Premature Infant recommended reference ranges:  Up to 24 hours.............<8.0 mg/dL  Up to 48 hours............<12.0 mg/dL  3-5 days..................<15.0 mg/dL  6-29 days.................<15.0 mg/dL           BUN       20         Calcium       7.3         Chloride       105         CO2       25         Creatinine       1.5         Differential Method       Automated         eGFR       36         Eos #       0.1         Eosinophil %       0.8         Glucose       110         Gran # (ANC)       13.8         Gran %       88.0         Hematocrit       28.2         Hemoglobin       9.7         Immature Grans (Abs)       0.06  Comment: Mild elevation in immature granulocytes is non specific and   can be seen in a variety of conditions including stress response,   acute inflammation, trauma and pregnancy. Correlation with other   laboratory and clinical findings is essential.           Immature Granulocytes       0.4         Lymph #       0.9         Lymph %       5.5         Magnesium        2.0         MCH       31.0         MCHC       34.4         MCV       90         Mono #       0.8         Mono %       4.9         MPV       10.1         nRBC       0         Phosphorus       2.5         Platelets       159         POCT Glucose 118   169   95     121       Potassium       3.7         PROTEIN TOTAL       5.0         RBC       3.13         RDW       12.7         Sodium       138         WBC       15.72                                Significant Imaging: I have reviewed all pertinent imaging results/findings within the past 24 hours.    US Kidney   Final Result      No significant abnormality.         Electronically signed by: Saul Beltre   Date:    09/06/2023   Time:    09:09      X-Ray Chest AP Portable   Final Result      No acute abnormality.         Electronically signed  by: Melvin Martin   Date:    09/05/2023   Time:    20:58

## 2023-09-08 NOTE — PLAN OF CARE
09/08/23 1157   Post-Acute Status   Post-Acute Authorization Placement   Post-Acute Placement Status Set-up Complete/Auth obtained   Discharge Delays None known at this time   Discharge Plan   Discharge Plan A Skilled Nursing Facility     Makenna spoke with pt's dtr and son-in-law to discuss SNF acceptances. Pt's family selected [facility] since it is closest to their home.     Makenna notified Audrey in Admissions at [facility].     Plan for d/c Monday to SNF.

## 2023-09-08 NOTE — PROGRESS NOTES
[facility] Medicine  Progress Note    Patient Name: Shannan Shin  MRN: [id number]  Patient Class: IP- Inpatient   Admission Date: 9/6/2023  Length of Stay: 2 days  Attending Physician: Alexis Howe MD  Primary Care Provider: Danny Jackson MD        Subjective:     Principal Problem:Sepsis        HPI:  The patient is a 76 yo [place] female with DM, HTN, and HLD who presents to ED with Fever, generalized weakness, and fatigue x 2 days. The pt's son reported the pt also complained of dysuria for approx one week. She was seen at  2 days ago for her symptoms and was diagnosed with a UTI discharged on Cipro and Pyridium without improvement.     In the ED, Temp 102.9F, mild tachycardia and tachypnea, BP low normal. Labs showed WBC 16 with 9% bands, Hgb 9.1 (unsure of baseline), Cr 2.2 (unsure of baseline), Na 131, Bicarb 16, LA normal, procalcitonin 27. +UTI. COVID/FLU negative. CXR- clear.       Overview/Hospital Course:  77 year old female, under the care of Hospital Medicine for management of UTI/Sepsis. BC: + gram neg rods, changed IV antibiotic to Rocephin. Afebrile overnight. Improved appetite. PT/OT to eval. Hgb improved to 9.7, received 1U PRBC on admission. Iron studies consistent with anemia of chronic disease. Vitals stable.       Interval History: NAEON, family present, translating; patient reports dysuria, improving since admission, no new issues. SNF placement pending.    Review of Systems   All other systems reviewed and are negative.    Objective:     Vital Signs (Most Recent):  Temp: 97.2 °F (36.2 °C) (09/08/23 1605)  Pulse: 76 (09/08/23 1605)  Resp: 18 (09/08/23 1146)  BP: 130/60 (09/08/23 1605)  SpO2: 98 % (09/08/23 1146) Vital Signs (24h Range):  Temp:  [97.2 °F (36.2 °C)-101 °F (38.3 °C)] 97.2 °F (36.2 °C)  Pulse:  [74-89] 76  Resp:  [16-18] 18  SpO2:  [91 %-98 %] 98 %  BP: (111-146)/(56-65) 130/60     Weight: 36.6 kg (80 lb 9.6 oz)  Body mass index is 15.74  kg/m².    Intake/Output Summary (Last 24 hours) at 9/8/2023 1713  Last data filed at 9/8/2023 1511  Gross per 24 hour   Intake 1492.47 ml   Output 2600 ml   Net -1107.53 ml         Physical Exam  Vitals and nursing note reviewed.   Constitutional:       General: She is not in acute distress.     Appearance: Normal appearance. She is normal weight.   Cardiovascular:      Rate and Rhythm: Normal rate and regular rhythm.      Heart sounds: No murmur heard.  Pulmonary:      Effort: Pulmonary effort is normal. No respiratory distress.      Breath sounds: No wheezing.   Neurological:      General: No focal deficit present.      Mental Status: She is alert and oriented to person, place, and time.   Psychiatric:         Mood and Affect: Mood normal.         Behavior: Behavior normal.             Significant Labs: All pertinent labs within the past 24 hours have been reviewed.  Recent Lab Results  (Last 5 results in the past 24 hours)        09/08/23  1610   09/08/23  1130   09/08/23  0540   09/08/23  0435   09/07/23  1727        Albumin       1.8         Alkaline Phosphatase       77         ALT       12         Anion Gap       8         Aniso       Slight         AST       25         Baso #       0.07         Basophil %       0.4         BILIRUBIN TOTAL       0.2  Comment: For infants and newborns, interpretation of results should be based  on gestational age, weight and in agreement with clinical  observations.    Premature Infant recommended reference ranges:  Up to 24 hours.............<8.0 mg/dL  Up to 48 hours............<12.0 mg/dL  3-5 days..................<15.0 mg/dL  6-29 days.................<15.0 mg/dL           BUN       20         Calcium       7.3         Chloride       105         CO2       25         Creatinine       1.5         Differential Method       Automated         eGFR       36         Eos #       0.1         Eosinophil %       0.8         Glucose       110         Gran # (ANC)       13.8          Gran %       88.0         Hematocrit       28.2         Hemoglobin       9.7         Immature Grans (Abs)       0.06  Comment: Mild elevation in immature granulocytes is non specific and   can be seen in a variety of conditions including stress response,   acute inflammation, trauma and pregnancy. Correlation with other   laboratory and clinical findings is essential.           Immature Granulocytes       0.4         Lymph #       0.9         Lymph %       5.5         Magnesium        2.0         MCH       31.0         MCHC       34.4         MCV       90         Mono #       0.8         Mono %       4.9         MPV       10.1         nRBC       0         Phosphorus       2.5         Platelets       159         POCT Glucose 118   169   95     121       Potassium       3.7         PROTEIN TOTAL       5.0         RBC       3.13         RDW       12.7         Sodium       138         WBC       15.72                                Significant Imaging: I have reviewed all pertinent imaging results/findings within the past 24 hours.    US Kidney   Final Result      No significant abnormality.         Electronically signed by: Saul Beltre   Date:    09/06/2023   Time:    09:09      X-Ray Chest AP Portable   Final Result      No acute abnormality.         Electronically signed by: Melvin Martin   Date:    09/05/2023   Time:    20:58              Assessment/Plan:      * Sepsis  This patient does have evidence of infective focus  My overall impression is sepsis.  Source: Urinary Tract  Antibiotics given-   Antibiotics (72h ago, onward)    Start     Stop Route Frequency Ordered    09/07/23 0915  cefTRIAXone (ROCEPHIN) 2 g in dextrose 5 % in water (D5W) 100 mL IVPB (MB+)         -- IV Every 24 hours (non-standard times) 09/07/23 0800        Latest lactate reviewed-  Recent Labs   Lab 09/06/23  0136 09/06/23  0509   LACTATE 1.4 0.7     Organ dysfunction indicated by Acute kidney injury    Fluid challenge Actual Body  weight- Patient will receive 30ml/kg actual body weight to calculate fluid bolus for treatment of septic shock.     Post- resuscitation assessment Yes Perfusion exam was performed within 6 hours of septic shock presentation after bolus shows Adequate tissue perfusion assessed by non-invasive monitoring       Will Not start Pressors- Levophed for MAP of 65  Source control achieved by: IV Cefepime    Hypertension  BP low normal   Hold antihypertensives       Diabetes  Patient's FSGs are controlled on current medication regimen.  Last A1c reviewed-   Lab Results   Component Value Date    HGBA1C 5.7 (H) 09/06/2023     Most recent fingerstick glucose reviewed-   Recent Labs   Lab 09/06/23  2145 09/07/23  1152   POCTGLUCOSE 150* 176*     Current correctional scale  Low  Maintain anti-hyperglycemic dose as follows-   Antihyperglycemics (From admission, onward)    Start     Stop Route Frequency Ordered    09/06/23 0531  insulin aspart U-100 pen 0-5 Units         -- SubQ Before meals & nightly PRN 09/06/23 0433        Hold Oral hypoglycemics while patient is in the hospital.    Check Hgb A1c  Monitor     Metabolic acidosis  likely 2/2 dehydration and TRENT  IVFs for now   Monitor       Hyponatremia  Patient has hyponatremia which is uncontrolled,We will aim to correct the sodium by 4-6mEq in 24 hours. We will monitor sodium Daily. The hyponatremia is due to Dehydration/hypovolemia.  We will treat the hyponatremia with IV fluids as follows: NS iVfs. The patient's sodium results have been reviewed and are listed below.  Recent Labs   Lab 09/07/23  0655   *       ARF (acute renal failure)  Patient with acute kidney injury/acute renal failure likely due to pre-renal azotemia due to dehydration TRENT is currently worsening. Baseline creatinine unknown - Labs reviewed- Renal function/electrolytes with Estimated Creatinine Clearance: 15.4 mL/min (A) (based on SCr of 1.8 mg/dL (H)). according to latest data. Monitor urine output  and serial BMP and adjust therapy as needed. Avoid nephrotoxins and renally dose meds for GFR listed above.    Unknown baseline   Renal ultrasound  IVfs  Monitor     Acute cystitis without hematuria  IV Cefepime changed to Rocephin  Blood and urine culture   BC: + gram neg rods  UC: pending      Normocytic anemia  No hematochezia, melena, hematemesis  Iron studies- consistent with anemia of chronic disease  S/P 1U PRBC on 9/6    --Daily CBC  --Transfuse with 1 unit PRBC for Hgb <7.0 or <8.0 if symptomatic         VTE Risk Mitigation (From admission, onward)         Ordered     enoxaparin injection 30 mg  Daily         09/06/23 0439     IP VTE HIGH RISK PATIENT  Once         09/06/23 0433     Place sequential compression device  Until discontinued         09/06/23 0433                Discharge Planning   SCARLETT:      Code Status: Full Code   Is the patient medically ready for discharge?:     Reason for patient still in hospital (select all that apply): Patient trending condition, Laboratory test, Treatment and Pending disposition  Discharge Plan A: Skilled Nursing Facility   Discharge Delays: None known at this time              Alexis Howe MD  Department of Hospital Medicine   [facility] Med S[facility]

## 2023-09-08 NOTE — PLAN OF CARE
Problem: Adult Inpatient Plan of Care  Goal: Plan of Care Review  Outcome: Ongoing, Progressing  Goal: Patient-Specific Goal (Individualized)  Outcome: Ongoing, Progressing  Goal: Absence of Hospital-Acquired Illness or Injury  Outcome: Ongoing, Progressing  Goal: Optimal Comfort and Wellbeing  Outcome: Ongoing, Progressing  Goal: Readiness for Transition of Care  Outcome: Ongoing, Progressing     Problem: Infection  Goal: Absence of Infection Signs and Symptoms  Outcome: Ongoing, Progressing     Problem: Diabetes Comorbidity  Goal: Blood Glucose Level Within Targeted Range  Outcome: Ongoing, Progressing     Problem: Adjustment to Illness (Sepsis/Septic Shock)  Goal: Optimal Coping  Outcome: Ongoing, Progressing     Problem: Bleeding (Sepsis/Septic Shock)  Goal: Absence of Bleeding  Outcome: Ongoing, Progressing     Problem: Glycemic Control Impaired (Sepsis/Septic Shock)  Goal: Blood Glucose Level Within Desired Range  Outcome: Ongoing, Progressing     Problem: Infection Progression (Sepsis/Septic Shock)  Goal: Absence of Infection Signs and Symptoms  Outcome: Ongoing, Progressing     Problem: Nutrition Impaired (Sepsis/Septic Shock)  Goal: Optimal Nutrition Intake  Outcome: Ongoing, Progressing     Problem: Fluid and Electrolyte Imbalance (Acute Kidney Injury/Impairment)  Goal: Fluid and Electrolyte Balance  Outcome: Ongoing, Progressing     Problem: Oral Intake Inadequate (Acute Kidney Injury/Impairment)  Goal: Optimal Nutrition Intake  Outcome: Ongoing, Progressing     Problem: Renal Function Impairment (Acute Kidney Injury/Impairment)  Goal: Effective Renal Function  Outcome: Ongoing, Progressing

## 2023-09-08 NOTE — PLAN OF CARE
Care plan reviewed with patient. Up in the chair with assist.  Free from falls. No other complaints made. All orders checked and reviewed.

## 2023-09-09 VITALS
WEIGHT: 80.63 LBS | DIASTOLIC BLOOD PRESSURE: 92 MMHG | BODY MASS INDEX: 15.83 KG/M2 | SYSTOLIC BLOOD PRESSURE: 136 MMHG | OXYGEN SATURATION: 95 % | TEMPERATURE: 99 F | HEART RATE: 69 BPM | HEIGHT: 60 IN | RESPIRATION RATE: 18 BRPM

## 2023-09-09 PROBLEM — A41.9 SEPSIS: Status: RESOLVED | Noted: 2023-09-06 | Resolved: 2023-09-09

## 2023-09-09 LAB
ANION GAP SERPL CALC-SCNC: 10 MMOL/L (ref 8–16)
BASOPHILS # BLD AUTO: 0.07 K/UL (ref 0–0.2)
BASOPHILS NFR BLD: 0.6 % (ref 0–1.9)
BUN SERPL-MCNC: 13 MG/DL (ref 8–23)
CALCIUM SERPL-MCNC: 7.9 MG/DL (ref 8.7–10.5)
CHLORIDE SERPL-SCNC: 104 MMOL/L (ref 95–110)
CO2 SERPL-SCNC: 27 MMOL/L (ref 23–29)
CREAT SERPL-MCNC: 1.1 MG/DL (ref 0.5–1.4)
DIFFERENTIAL METHOD: ABNORMAL
EOSINOPHIL # BLD AUTO: 0.3 K/UL (ref 0–0.5)
EOSINOPHIL NFR BLD: 2.4 % (ref 0–8)
ERYTHROCYTE [DISTWIDTH] IN BLOOD BY AUTOMATED COUNT: 12.5 % (ref 11.5–14.5)
EST. GFR  (NO RACE VARIABLE): 52 ML/MIN/1.73 M^2
GLUCOSE SERPL-MCNC: 95 MG/DL (ref 70–110)
HCT VFR BLD AUTO: 29 % (ref 37–48.5)
HGB BLD-MCNC: 9.7 G/DL (ref 12–16)
IMM GRANULOCYTES # BLD AUTO: 0.06 K/UL (ref 0–0.04)
IMM GRANULOCYTES NFR BLD AUTO: 0.5 % (ref 0–0.5)
LYMPHOCYTES # BLD AUTO: 1 K/UL (ref 1–4.8)
LYMPHOCYTES NFR BLD: 8.6 % (ref 18–48)
MCH RBC QN AUTO: 30.4 PG (ref 27–31)
MCHC RBC AUTO-ENTMCNC: 33.4 G/DL (ref 32–36)
MCV RBC AUTO: 91 FL (ref 82–98)
MONOCYTES # BLD AUTO: 0.9 K/UL (ref 0.3–1)
MONOCYTES NFR BLD: 7.9 % (ref 4–15)
NEUTROPHILS # BLD AUTO: 9 K/UL (ref 1.8–7.7)
NEUTROPHILS NFR BLD: 80 % (ref 38–73)
NRBC BLD-RTO: 0 /100 WBC
PLATELET # BLD AUTO: 187 K/UL (ref 150–450)
PMV BLD AUTO: 9.7 FL (ref 9.2–12.9)
POCT GLUCOSE: 182 MG/DL (ref 70–110)
POCT GLUCOSE: 94 MG/DL (ref 70–110)
POTASSIUM SERPL-SCNC: 3.3 MMOL/L (ref 3.5–5.1)
RBC # BLD AUTO: 3.19 M/UL (ref 4–5.4)
SODIUM SERPL-SCNC: 141 MMOL/L (ref 136–145)
WBC # BLD AUTO: 11.29 K/UL (ref 3.9–12.7)

## 2023-09-09 PROCEDURE — 80048 BASIC METABOLIC PNL TOTAL CA: CPT | Performed by: HOSPITALIST

## 2023-09-09 PROCEDURE — 36415 COLL VENOUS BLD VENIPUNCTURE: CPT | Performed by: HOSPITALIST

## 2023-09-09 PROCEDURE — 25000003 PHARM REV CODE 250: Performed by: NURSE PRACTITIONER

## 2023-09-09 PROCEDURE — 97110 THERAPEUTIC EXERCISES: CPT

## 2023-09-09 PROCEDURE — 94761 N-INVAS EAR/PLS OXIMETRY MLT: CPT

## 2023-09-09 PROCEDURE — 85025 COMPLETE CBC W/AUTO DIFF WBC: CPT | Performed by: HOSPITALIST

## 2023-09-09 PROCEDURE — 97530 THERAPEUTIC ACTIVITIES: CPT

## 2023-09-09 PROCEDURE — 25000003 PHARM REV CODE 250: Performed by: HOSPITALIST

## 2023-09-09 PROCEDURE — 63600175 PHARM REV CODE 636 W HCPCS: Performed by: NURSE PRACTITIONER

## 2023-09-09 RX ORDER — AMLODIPINE BESYLATE 5 MG/1
5 TABLET ORAL DAILY
Status: DISCONTINUED | OUTPATIENT
Start: 2023-09-09 | End: 2023-09-09 | Stop reason: HOSPADM

## 2023-09-09 RX ORDER — POTASSIUM CHLORIDE 20 MEQ/1
20 TABLET, EXTENDED RELEASE ORAL ONCE
Status: COMPLETED | OUTPATIENT
Start: 2023-09-09 | End: 2023-09-09

## 2023-09-09 RX ORDER — LOSARTAN POTASSIUM 50 MG/1
100 TABLET ORAL DAILY
Status: DISCONTINUED | OUTPATIENT
Start: 2023-09-09 | End: 2023-09-09 | Stop reason: HOSPADM

## 2023-09-09 RX ORDER — CEPHALEXIN 500 MG/1
500 CAPSULE ORAL EVERY 12 HOURS
Qty: 6 CAPSULE | Refills: 0 | Status: SHIPPED | OUTPATIENT
Start: 2023-09-09 | End: 2023-09-09 | Stop reason: SDUPTHER

## 2023-09-09 RX ORDER — CEPHALEXIN 500 MG/1
500 CAPSULE ORAL EVERY 12 HOURS
Qty: 6 CAPSULE | Refills: 0 | Status: SHIPPED | OUTPATIENT
Start: 2023-09-09 | End: 2023-09-12

## 2023-09-09 RX ADMIN — CEFTRIAXONE SODIUM 2 G: 2 INJECTION, POWDER, FOR SOLUTION INTRAMUSCULAR; INTRAVENOUS at 09:09

## 2023-09-09 RX ADMIN — POTASSIUM CHLORIDE 20 MEQ: 1500 TABLET, EXTENDED RELEASE ORAL at 09:09

## 2023-09-09 RX ADMIN — SODIUM BICARBONATE: 84 INJECTION, SOLUTION INTRAVENOUS at 02:09

## 2023-09-09 RX ADMIN — AMLODIPINE BESYLATE 5 MG: 5 TABLET ORAL at 01:09

## 2023-09-09 RX ADMIN — LOSARTAN POTASSIUM 100 MG: 50 TABLET, FILM COATED ORAL at 01:09

## 2023-09-09 NOTE — NURSING
POC discussed with patient and daughter at bedside; [employer] services utilized. VSS. Denies pain. Voiding spontaneously. Up with x1 assist. Remains free from injury. Purposeful rounding. IV removed per policy. Discharge instructions reviewed with patient and daughter; Verbalized understanding. No further concerns at this time. All questions answered.Chart review completed.

## 2023-09-09 NOTE — PT/OT/SLP PROGRESS
Physical Therapy  Treatment    Shannan Shin   MRN: [id number]   Admitting Diagnosis: Sepsis    PT Received On: 09/09/23  PT Start Time: 1125     PT Stop Time: 1150    PT Total Time (min): 25 min       Billable Minutes:  Therapeutic Activity 15 and Therapeutic Exercise 10    Treatment Type: Treatment  PT/PTA: PT     Number of PTA visits since last PT visit: 0       General Precautions: Standard, fall  Orthopedic Precautions: N/A  Braces: N/A  Respiratory Status: Room air         Subjective:  Communicated with ESEQUIEL Del Valle prior to session.    Micaela  used throughout session    Pt agreeable to PT session      Pain/Comfort  Pain Rating 1: 0/10    Objective:   Patient found with: telemetry, peripheral IV        Treatment and Education:  Treatment completed. Pt found supine in bed upon PT arrival. Pt transitioned to EOB with S assist. Pt sat EOB x 10 minutes for performance of seated LE exercise including LAQ, marches, hip adduction squeezes, and heel rocks. Pt performed STS x 5 reps from EOB with min A. Pt then ambulated to restroom with HHA and performed all self care with min A. Pt ambulated  back to bed with HHA and returned to bed with S assist.     Pt educated on purpose of PT, call don't fall, and being OOB throughout the day. Pt also educated on exercise within the room daily including: LAQ, heel rocks, ankle pumps, and hip add and abduction.        AM-PAC 6 CLICK MOBILITY  How much help from another person does this patient currently need?   1 = Unable, Total/Dependent Assistance  2 = A lot, Maximum/Moderate Assistance  3 = A little, Minimum/Contact Guard/Supervision  4 = None, Modified [place]/Independent    Turning over in bed (including adjusting bedclothes, sheets and blankets)?: 4  Sitting down on and standing up from a chair with arms (e.g., wheelchair, bedside commode, etc.): 3  Moving from lying on back to sitting on the side of the bed?: 4  Moving to and from a bed to a chair (including a  wheelchair)?: 3  Need to walk in hospital room?: 3  Climbing 3-5 steps with a railing?: 1  Basic Mobility Total Score: 18    AM-PAC Raw Score CMS G-Code Modifier Level of Impairment Assistance   6 % Total / Unable   7 - 9 CM 80 - 100% Maximal Assist   10 - 14 CL 60 - 80% Moderate Assist   15 - 19 CK 40 - 60% Moderate Assist   20 - 22 CJ 20 - 40% Minimal Assist   23 CI 1-20% SBA / CGA   24 CH 0% Independent/ Mod I     Patient left supine with all lines intact, call button in reach, bed alarm on, and family present.    Assessment:  Shannan Shin is a 77 y.o. female with a medical diagnosis of Sepsis and presents with weakness, gait instability, decreased ROM, decreased lower extremity function, decreased safety awareness, decreased coordination, impaired functional mobility    Rehab potential is good.    Activity tolerance: Good    Discharge recommendations: nursing facility, skilled      Barriers to discharge:      Equipment recommendations: to be determined by next level of care     GOALS:   Multidisciplinary Problems       Physical Therapy Goals          Problem: Physical Therapy    Goal Priority Disciplines Outcome Goal Variances Interventions   Physical Therapy Goal     PT, PT/OT Ongoing, Progressing     Description: LTG'S TO BE MET IN 14 DAYS (9-20-23)  PT WILL REQUIRE [employer] FOR BED MOBILITY  PT WILL REQUIRE [employer] FOR BED<>CHAIR TF'S  PT WILL  FEET WITH OR WITHOUT RW AND MODA  PT WILL INC AMPAC SCORE BY 2 POINTS TO PROGRESS GROSS FUNC MOBILITY                         PLAN:    Patient to be seen 3 x/week to address the above listed problems via gait training, therapeutic activities, therapeutic exercises, neuromuscular re-education  Plan of Care expires: 09/20/23  Plan of Care reviewed with: patient, family         09/09/2023

## 2023-09-09 NOTE — PLAN OF CARE
[facility] - Med Surg  Discharge Final Note    Primary Care Provider: Danny Jackson MD    Expected Discharge Date: 9/9/2023    Final Discharge Note (most recent)       Final Note - 09/09/23 1445          Final Note    Assessment Type Final Discharge Note     Anticipated Discharge Disposition Home or Self Care        Post-Acute Status    HME Status Set-up Complete/Auth obtained                     Important Message from Medicare             Contact Info       Danny Jackson MD   Specialty: Cardiology   Relationship: PCP - General    [address] [place] [id number]   Phone: [phone]       Next Steps: Schedule an appointment as soon as possible for a visit in 1 week(s)    Instructions: Hospital discharge follow up          Discharge home with RW.  BSC not covered by insurance.  Family declined home health.

## 2023-09-09 NOTE — DISCHARGE SUMMARY
[facility] Medicine  Discharge Summary      Patient Name: Shannan Shin  MRN: [id number]  JOSE ALBERTO: [id number]  Patient Class: IP- Inpatient  Admission Date: 9/6/2023  Hospital Length of Stay: 3 days  Discharge Date and Time: No discharge date for patient encounter.  Attending Physician: Alexis Howe MD   Discharging Provider: Alexis Howe MD  Primary Care Provider: Danny Jackson MD    Primary Care Team: Networked reference to record PCT     HPI:   The patient is a 78 yo [place] female with DM, HTN, and HLD who presents to ED with Fever, generalized weakness, and fatigue x 2 days. The pt's son reported the pt also complained of dysuria for approx one week. She was seen at  2 days ago for her symptoms and was diagnosed with a UTI discharged on Cipro and Pyridium without improvement.     In the ED, Temp 102.9F, mild tachycardia and tachypnea, BP low normal. Labs showed WBC 16 with 9% bands, Hgb 9.1 (unsure of baseline), Cr 2.2 (unsure of baseline), Na 131, Bicarb 16, LA normal, procalcitonin 27. +UTI. COVID/FLU negative. CXR- clear.       * No surgery found *      Hospital Course:   77 year old female, under the care of Hospital Medicine for management of UTI/Sepsis. BC: + gram neg rods, changed IV antibiotic to Rocephin. Afebrile overnight. Improved appetite. PT/OT to eval. Hgb improved to 9.7, received 1U PRBC on admission. Iron studies consistent with anemia of chronic disease. Vitals stable.     Blood and urine cultures +E. Coli  Patient's condition improved, no further complaints of dysuria   hotline used to communicate with patient, grand-daughter at bedside who unable to translate.  Also discussed with patient's son over phone while in room  Patient wishes to return home rather than SNF  Plan to arrange home health with PT/OT  Benefit from rolling walker and bedside commode, DME ordered  Plan to complete course of PO antibiotics on discharge  Advised to f/u with PCP in 1-2  "weeks  Stable for hospital discharge       Goals of Care Treatment Preferences:  Code Status: Full Code      Consults:   Consults (From admission, onward)        Status Ordering Provider     Inpatient consult to Social Work  Once        Provider:  (Not yet assigned)    Completed GILBERT, RAMONE     Inpatient consult to Social Work  Once        Provider:  (Not yet assigned)    Completed GILBERT, RAMONE     Inpatient consult to Hospitalist  Once        Provider:  Mathieu Nj MD    Acknowledged RADHA MASCORRO          No new Assessment & Plan notes have been filed under this hospital service since the last note was generated.  Service: Hospital Medicine    Final Active Diagnoses:    Diagnosis Date Noted POA    Acute cystitis without hematuria [N30.00] 09/06/2023 Yes    ARF (acute renal failure) [N17.9] 09/06/2023 Yes    Hyponatremia [E87.1] 09/06/2023 Yes    Metabolic acidosis [E87.20] 09/06/2023 Yes    Diabetes [E11.9] 09/06/2023 Yes    Hypertension [I10] 09/06/2023 Yes    Normocytic anemia [D64.9] 02/02/2016 Yes      Problems Resolved During this Admission:    Diagnosis Date Noted Date Resolved POA    PRINCIPAL PROBLEM:  Sepsis [A41.9] 09/06/2023 09/09/2023 Yes       Discharged Condition: stable    Disposition: Home-Health Care [place]    Follow Up:   Follow-up Information     Danny Jackson MD. Schedule an appointment as soon as possible for a visit in 1 week(s).    Specialty: Cardiology  Why: Hospital discharge follow up  Contact information:  [id number] [facility] [address] [phone]                       Patient Instructions:      WALKER FOR HOME USE     Order Specific Question Answer Comments   Type of Walker: Adult (5'4"-6'6")    With wheels? Yes    Height: 5' (1.524 m)    Weight: 36.6 kg (80 lb 9.6 oz)    Length of need (1-99 months): 99    Does patient have medical equipment at home? none    Please check all that apply: Patient's condition impairs ambulation.    Please check all that apply: " Patient is unable to safely ambulate without equipment.      COMMODE FOR HOME USE     Order Specific Question Answer Comments   Type: Standard    Height: 5' (1.524 m)    Weight: 36.6 kg (80 lb 9.6 oz)    Does patient have medical equipment at home? none    Length of need (1-99 months): 99      Notify your health care provider if you experience any of the following:  temperature >100.4     Notify your health care provider if you experience any of the following:  persistent nausea and vomiting or diarrhea     Notify your health care provider if you experience any of the following:  redness, tenderness, or signs of infection (pain, swelling, redness, odor or green/yellow discharge around incision site)     Notify your health care provider if you experience any of the following:  increased confusion or weakness     Activity as tolerated       Significant Diagnostic Studies: Labs: All labs within the past 24 hours have been reviewed    Pending Diagnostic Studies:     None         Medications:  Reconciled Home Medications:      Medication List      START taking these medications    cephALEXin 500 MG capsule  Commonly known as: KEFLEX  Take 1 capsule (500 mg total) by mouth every 12 (twelve) hours. for 3 days        CONTINUE taking these medications    amLODIPine 5 MG tablet  Commonly known as: NORVASC  Take 5 mg by mouth once daily.     CENTRUM SILVER ORAL  Take 1 tablet by mouth once daily.     glyBURIDE micronized 3 MG Tab  Commonly known as: GLYNASE  Take 1.5 mg by mouth daily with breakfast.     losartan 100 MG tablet  Commonly known as: COZAAR  Take 100 mg by mouth once daily.     lovastatin 20 MG tablet  Commonly known as: MEVACOR  Take 20 mg by mouth every evening.     metFORMIN 850 MG tablet  Commonly known as: GLUCOPHAGE  Take 850 mg by mouth 2 (two) times daily with meals.            Indwelling Lines/Drains at time of discharge:   Lines/Drains/Airways     None                 Time spent on the discharge of  patient: 75 minutes         Alexis Howe MD  Department of Hospital Medicine  '[facility]

## 2023-09-09 NOTE — PLAN OF CARE
Treatment completed. Pt found supine in bed upon PT arrival. Pt transitioned to EOB with S assist. Pt sat EOB x 10 minutes for performance of seated LE exercise including LAQ, marches, hip adduction squeezes, and heel rocks. Pt performed STS x 5 reps from EOB with min A. Pt then ambulated to restroom with HHA and performed all self care with min A. Pt ambulated  back to bed with HHA and returned to bed with S assist.

## 2023-09-09 NOTE — PLAN OF CARE
09/09/23 1405   Post-Acute Status   Post-Acute Authorization HME;Home Health   HME Status Referrals Sent   Home Health Status Patient declined/refused     Spoke to patient's son regarding home health.  Son declined home health, does not want some one to come to house.  Son reports he will call doctor if they decide they want home health. Referral for dme sent to [facility].

## 2023-09-09 NOTE — PLAN OF CARE
Patient remains free from injury this shift. Safety precautions maintained. No s/s of acute distress noted.  IVF infusing. Side rails up X2 and daughter at the bedside.  Blood glucose monitoring continued this shift per orders. Chart and orders review complete. Patient education about care complete.       Problem: Adult Inpatient Plan of Care  Goal: Plan of Care Review  Outcome: Ongoing, Progressing  Goal: Patient-Specific Goal (Individualized)  Outcome: Ongoing, Progressing  Goal: Absence of Hospital-Acquired Illness or Injury  Outcome: Ongoing, Progressing  Goal: Optimal Comfort and Wellbeing  Outcome: Ongoing, Progressing  Goal: Readiness for Transition of Care  Outcome: Ongoing, Progressing     Problem: Infection  Goal: Absence of Infection Signs and Symptoms  Outcome: Ongoing, Progressing     Problem: Diabetes Comorbidity  Goal: Blood Glucose Level Within Targeted Range  Outcome: Ongoing, Progressing     Problem: Infection Progression (Sepsis/Septic Shock)  Goal: Absence of Infection Signs and Symptoms  Outcome: Ongoing, Progressing

## 2023-09-09 NOTE — PLAN OF CARE
Problem: Adult Inpatient Plan of Care  Goal: Plan of Care Review  Outcome: Met  Goal: Patient-Specific Goal (Individualized)  Outcome: Met  Goal: Absence of Hospital-Acquired Illness or Injury  Outcome: Met  Goal: Optimal Comfort and Wellbeing  Outcome: Met  Goal: Readiness for Transition of Care  Outcome: Met     Problem: Infection  Goal: Absence of Infection Signs and Symptoms  Outcome: Met     Problem: Diabetes Comorbidity  Goal: Blood Glucose Level Within Targeted Range  Outcome: Met     Problem: Adjustment to Illness (Sepsis/Septic Shock)  Goal: Optimal Coping  Outcome: Met     Problem: Bleeding (Sepsis/Septic Shock)  Goal: Absence of Bleeding  Outcome: Met     Problem: Glycemic Control Impaired (Sepsis/Septic Shock)  Goal: Blood Glucose Level Within Desired Range  Outcome: Met     Problem: Infection Progression (Sepsis/Septic Shock)  Goal: Absence of Infection Signs and Symptoms  Outcome: Met     Problem: Nutrition Impaired (Sepsis/Septic Shock)  Goal: Optimal Nutrition Intake  Outcome: Met     Problem: Fluid and Electrolyte Imbalance (Acute Kidney Injury/Impairment)  Goal: Fluid and Electrolyte Balance  Outcome: Met     Problem: Oral Intake Inadequate (Acute Kidney Injury/Impairment)  Goal: Optimal Nutrition Intake  Outcome: Met     Problem: Renal Function Impairment (Acute Kidney Injury/Impairment)  Goal: Effective Renal Function  Outcome: Met     Problem: Skin Injury Risk Increased  Goal: Skin Health and Integrity  Outcome: Met

## 2023-12-11 PROBLEM — N17.9 ARF (ACUTE RENAL FAILURE): Status: RESOLVED | Noted: 2023-09-06 | Resolved: 2023-12-11
